# Patient Record
Sex: FEMALE | Race: WHITE | NOT HISPANIC OR LATINO
[De-identification: names, ages, dates, MRNs, and addresses within clinical notes are randomized per-mention and may not be internally consistent; named-entity substitution may affect disease eponyms.]

---

## 2017-01-10 ENCOUNTER — APPOINTMENT (OUTPATIENT)
Dept: INTERNAL MEDICINE | Facility: CLINIC | Age: 62
End: 2017-01-10

## 2017-01-10 VITALS
SYSTOLIC BLOOD PRESSURE: 142 MMHG | RESPIRATION RATE: 12 BRPM | BODY MASS INDEX: 50.02 KG/M2 | WEIGHT: 293 LBS | HEIGHT: 64 IN | DIASTOLIC BLOOD PRESSURE: 85 MMHG | HEART RATE: 89 BPM | TEMPERATURE: 98.3 F | OXYGEN SATURATION: 98 %

## 2017-01-10 DIAGNOSIS — Z72.3 LACK OF PHYSICAL EXERCISE: ICD-10-CM

## 2017-01-11 LAB
25(OH)D3 SERPL-MCNC: 42 NG/ML
ALBUMIN SERPL ELPH-MCNC: 3.9 G/DL
ALP BLD-CCNC: 70 U/L
ALT SERPL-CCNC: 60 U/L
ANION GAP SERPL CALC-SCNC: 18 MMOL/L
AST SERPL-CCNC: 54 U/L
BILIRUB SERPL-MCNC: 0.3 MG/DL
BUN SERPL-MCNC: 16 MG/DL
CALCIUM SERPL-MCNC: 9.9 MG/DL
CHLORIDE SERPL-SCNC: 103 MMOL/L
CHOLEST SERPL-MCNC: 202 MG/DL
CHOLEST/HDLC SERPL: 3.1 RATIO
CO2 SERPL-SCNC: 22 MMOL/L
CREAT SERPL-MCNC: 0.82 MG/DL
GLUCOSE SERPL-MCNC: 153 MG/DL
HDLC SERPL-MCNC: 66 MG/DL
LDLC SERPL CALC-MCNC: 110 MG/DL
POTASSIUM SERPL-SCNC: 4.7 MMOL/L
PROT SERPL-MCNC: 7.2 G/DL
SODIUM SERPL-SCNC: 143 MMOL/L
TRIGL SERPL-MCNC: 131 MG/DL

## 2017-01-12 ENCOUNTER — APPOINTMENT (OUTPATIENT)
Dept: ENDOCRINOLOGY | Facility: CLINIC | Age: 62
End: 2017-01-12

## 2017-01-18 ENCOUNTER — MEDICATION RENEWAL (OUTPATIENT)
Age: 62
End: 2017-01-18

## 2017-01-18 LAB — HBA1C MFR BLD HPLC: 7.8 %

## 2017-03-10 ENCOUNTER — APPOINTMENT (OUTPATIENT)
Dept: ENDOCRINOLOGY | Facility: CLINIC | Age: 62
End: 2017-03-10

## 2017-03-10 VITALS
DIASTOLIC BLOOD PRESSURE: 78 MMHG | HEART RATE: 86 BPM | OXYGEN SATURATION: 98 % | BODY MASS INDEX: 49.85 KG/M2 | HEIGHT: 64 IN | SYSTOLIC BLOOD PRESSURE: 122 MMHG | WEIGHT: 292 LBS

## 2017-04-11 ENCOUNTER — NON-APPOINTMENT (OUTPATIENT)
Age: 62
End: 2017-04-11

## 2017-04-11 ENCOUNTER — APPOINTMENT (OUTPATIENT)
Dept: INTERNAL MEDICINE | Facility: CLINIC | Age: 62
End: 2017-04-11

## 2017-04-11 VITALS
WEIGHT: 293 LBS | HEIGHT: 64 IN | RESPIRATION RATE: 12 BRPM | HEART RATE: 86 BPM | TEMPERATURE: 98.4 F | OXYGEN SATURATION: 98 % | SYSTOLIC BLOOD PRESSURE: 145 MMHG | BODY MASS INDEX: 50.02 KG/M2 | DIASTOLIC BLOOD PRESSURE: 83 MMHG

## 2017-04-11 VITALS — SYSTOLIC BLOOD PRESSURE: 125 MMHG | DIASTOLIC BLOOD PRESSURE: 70 MMHG

## 2017-04-11 DIAGNOSIS — Z00.00 ENCOUNTER FOR GENERAL ADULT MEDICAL EXAMINATION W/OUT ABNORMAL FINDINGS: ICD-10-CM

## 2017-04-12 LAB
25(OH)D3 SERPL-MCNC: 47.6 NG/ML
ALBUMIN SERPL ELPH-MCNC: 3.8 G/DL
ALP BLD-CCNC: 58 U/L
ALT SERPL-CCNC: 44 U/L
ANION GAP SERPL CALC-SCNC: 14 MMOL/L
APPEARANCE: CLEAR
AST SERPL-CCNC: 34 U/L
BASOPHILS # BLD AUTO: 0.04 K/UL
BASOPHILS NFR BLD AUTO: 0.4 %
BILIRUB SERPL-MCNC: 0.2 MG/DL
BILIRUBIN URINE: NEGATIVE
BLOOD URINE: NEGATIVE
BUN SERPL-MCNC: 16 MG/DL
CALCIUM SERPL-MCNC: 9.3 MG/DL
CHLORIDE SERPL-SCNC: 104 MMOL/L
CHOLEST SERPL-MCNC: 173 MG/DL
CHOLEST/HDLC SERPL: 3.1 RATIO
CO2 SERPL-SCNC: 20 MMOL/L
COLOR: YELLOW
CREAT SERPL-MCNC: 0.72 MG/DL
EOSINOPHIL # BLD AUTO: 0.24 K/UL
EOSINOPHIL NFR BLD AUTO: 2.7 %
GLUCOSE QUALITATIVE U: NORMAL MG/DL
GLUCOSE SERPL-MCNC: 167 MG/DL
HBA1C MFR BLD HPLC: 8.1 %
HCT VFR BLD CALC: 40 %
HDLC SERPL-MCNC: 56 MG/DL
HGB BLD-MCNC: 12.7 G/DL
IMM GRANULOCYTES NFR BLD AUTO: 0.2 %
KETONES URINE: NEGATIVE
LDLC SERPL CALC-MCNC: 93 MG/DL
LEUKOCYTE ESTERASE URINE: NEGATIVE
LYMPHOCYTES # BLD AUTO: 2.38 K/UL
LYMPHOCYTES NFR BLD AUTO: 26.4 %
MAN DIFF?: NORMAL
MCHC RBC-ENTMCNC: 27.4 PG
MCHC RBC-ENTMCNC: 31.8 GM/DL
MCV RBC AUTO: 86.2 FL
MONOCYTES # BLD AUTO: 0.78 K/UL
MONOCYTES NFR BLD AUTO: 8.6 %
NEUTROPHILS # BLD AUTO: 5.57 K/UL
NEUTROPHILS NFR BLD AUTO: 61.7 %
NITRITE URINE: NEGATIVE
PH URINE: 5.5
PLATELET # BLD AUTO: 292 K/UL
POTASSIUM SERPL-SCNC: 4.5 MMOL/L
PROT SERPL-MCNC: 6.7 G/DL
PROTEIN URINE: NEGATIVE MG/DL
RBC # BLD: 4.64 M/UL
RBC # FLD: 14.1 %
SODIUM SERPL-SCNC: 138 MMOL/L
SPECIFIC GRAVITY URINE: 1.02
TRIGL SERPL-MCNC: 120 MG/DL
TSH SERPL-ACNC: 2.64 UIU/ML
URATE SERPL-MCNC: 6.1 MG/DL
UROBILINOGEN URINE: NORMAL MG/DL
WBC # FLD AUTO: 9.03 K/UL

## 2017-06-13 ENCOUNTER — APPOINTMENT (OUTPATIENT)
Dept: ENDOCRINOLOGY | Facility: CLINIC | Age: 62
End: 2017-06-13

## 2017-06-13 VITALS
WEIGHT: 273 LBS | HEART RATE: 87 BPM | BODY MASS INDEX: 46.61 KG/M2 | OXYGEN SATURATION: 98 % | DIASTOLIC BLOOD PRESSURE: 70 MMHG | SYSTOLIC BLOOD PRESSURE: 122 MMHG | HEIGHT: 64 IN

## 2017-06-13 RX ORDER — GLIMEPIRIDE 1 MG/1
1 TABLET ORAL DAILY
Qty: 90 | Refills: 3 | Status: DISCONTINUED | COMMUNITY
Start: 2017-03-10 | End: 2017-06-13

## 2017-07-11 ENCOUNTER — APPOINTMENT (OUTPATIENT)
Dept: INTERNAL MEDICINE | Facility: CLINIC | Age: 62
End: 2017-07-11

## 2017-07-11 VITALS
BODY MASS INDEX: 46.1 KG/M2 | DIASTOLIC BLOOD PRESSURE: 84 MMHG | HEART RATE: 77 BPM | HEIGHT: 64 IN | WEIGHT: 270 LBS | RESPIRATION RATE: 14 BRPM | TEMPERATURE: 99 F | SYSTOLIC BLOOD PRESSURE: 129 MMHG | OXYGEN SATURATION: 98 %

## 2017-07-12 LAB
ALBUMIN SERPL ELPH-MCNC: 3.9 G/DL
ALP BLD-CCNC: 56 U/L
ALT SERPL-CCNC: 77 U/L
ANION GAP SERPL CALC-SCNC: 16 MMOL/L
AST SERPL-CCNC: 48 U/L
BILIRUB SERPL-MCNC: 0.2 MG/DL
BUN SERPL-MCNC: 19 MG/DL
CALCIUM SERPL-MCNC: 9.6 MG/DL
CHLORIDE SERPL-SCNC: 105 MMOL/L
CHOLEST SERPL-MCNC: 167 MG/DL
CHOLEST/HDLC SERPL: 2.8 RATIO
CO2 SERPL-SCNC: 22 MMOL/L
CREAT SERPL-MCNC: 0.85 MG/DL
GLUCOSE SERPL-MCNC: 113 MG/DL
HDLC SERPL-MCNC: 60 MG/DL
LDLC SERPL CALC-MCNC: 86 MG/DL
POTASSIUM SERPL-SCNC: 4.7 MMOL/L
PROT SERPL-MCNC: 6.8 G/DL
SODIUM SERPL-SCNC: 143 MMOL/L
TRIGL SERPL-MCNC: 104 MG/DL

## 2017-07-13 LAB — HBA1C MFR BLD HPLC: 6.2 %

## 2017-10-25 ENCOUNTER — APPOINTMENT (OUTPATIENT)
Dept: ENDOCRINOLOGY | Facility: CLINIC | Age: 62
End: 2017-10-25
Payer: COMMERCIAL

## 2017-10-25 VITALS
HEART RATE: 88 BPM | SYSTOLIC BLOOD PRESSURE: 120 MMHG | BODY MASS INDEX: 46.78 KG/M2 | OXYGEN SATURATION: 98 % | HEIGHT: 64 IN | DIASTOLIC BLOOD PRESSURE: 70 MMHG | WEIGHT: 274 LBS

## 2017-10-25 LAB
GLUCOSE BLDC GLUCOMTR-MCNC: 76
HBA1C MFR BLD HPLC: 6.4

## 2017-10-25 PROCEDURE — 82962 GLUCOSE BLOOD TEST: CPT

## 2017-10-25 PROCEDURE — 83036 HEMOGLOBIN GLYCOSYLATED A1C: CPT | Mod: QW

## 2017-10-25 PROCEDURE — G0008: CPT

## 2017-10-25 PROCEDURE — 99214 OFFICE O/P EST MOD 30 MIN: CPT | Mod: 25

## 2017-10-25 PROCEDURE — 90686 IIV4 VACC NO PRSV 0.5 ML IM: CPT

## 2017-10-27 LAB
25(OH)D3 SERPL-MCNC: 54.1 NG/ML
ALBUMIN SERPL ELPH-MCNC: 4.2 G/DL
ALP BLD-CCNC: 51 U/L
ALT SERPL-CCNC: 45 U/L
ANION GAP SERPL CALC-SCNC: 18 MMOL/L
AST SERPL-CCNC: 29 U/L
BASOPHILS # BLD AUTO: 0.04 K/UL
BASOPHILS NFR BLD AUTO: 0.3 %
BILIRUB SERPL-MCNC: <0.2 MG/DL
BUN SERPL-MCNC: 22 MG/DL
CALCIUM SERPL-MCNC: 9.9 MG/DL
CHLORIDE SERPL-SCNC: 105 MMOL/L
CHOLEST SERPL-MCNC: 211 MG/DL
CHOLEST/HDLC SERPL: 3 RATIO
CO2 SERPL-SCNC: 19 MMOL/L
CREAT SERPL-MCNC: 0.79 MG/DL
CREAT SPEC-SCNC: 87 MG/DL
EOSINOPHIL # BLD AUTO: 0.23 K/UL
EOSINOPHIL NFR BLD AUTO: 1.9 %
GLUCOSE SERPL-MCNC: 83 MG/DL
HCT VFR BLD CALC: 40.4 %
HDLC SERPL-MCNC: 70 MG/DL
HGB BLD-MCNC: 13.1 G/DL
IMM GRANULOCYTES NFR BLD AUTO: 0.3 %
LDLC SERPL CALC-MCNC: 115 MG/DL
LYMPHOCYTES # BLD AUTO: 3.89 K/UL
LYMPHOCYTES NFR BLD AUTO: 32.7 %
MAN DIFF?: NORMAL
MCHC RBC-ENTMCNC: 28.2 PG
MCHC RBC-ENTMCNC: 32.4 GM/DL
MCV RBC AUTO: 86.9 FL
MICROALBUMIN 24H UR DL<=1MG/L-MCNC: <0.3 MG/DL
MICROALBUMIN/CREAT 24H UR-RTO: NORMAL
MONOCYTES # BLD AUTO: 0.85 K/UL
MONOCYTES NFR BLD AUTO: 7.1 %
NEUTROPHILS # BLD AUTO: 6.85 K/UL
NEUTROPHILS NFR BLD AUTO: 57.7 %
PLATELET # BLD AUTO: 311 K/UL
POTASSIUM SERPL-SCNC: 4.8 MMOL/L
PROT SERPL-MCNC: 7.4 G/DL
RBC # BLD: 4.65 M/UL
RBC # FLD: 13.7 %
SODIUM SERPL-SCNC: 142 MMOL/L
T4 FREE SERPL-MCNC: 1.1 NG/DL
TRIGL SERPL-MCNC: 128 MG/DL
TSH SERPL-ACNC: 2.22 UIU/ML
WBC # FLD AUTO: 11.9 K/UL

## 2017-10-31 ENCOUNTER — APPOINTMENT (OUTPATIENT)
Dept: INTERNAL MEDICINE | Facility: CLINIC | Age: 62
End: 2017-10-31

## 2018-04-09 ENCOUNTER — APPOINTMENT (OUTPATIENT)
Dept: ENDOCRINOLOGY | Facility: CLINIC | Age: 63
End: 2018-04-09
Payer: COMMERCIAL

## 2018-04-09 VITALS — HEART RATE: 83 BPM | OXYGEN SATURATION: 98 % | DIASTOLIC BLOOD PRESSURE: 75 MMHG | SYSTOLIC BLOOD PRESSURE: 115 MMHG

## 2018-04-09 VITALS — WEIGHT: 284 LBS | BODY MASS INDEX: 48.49 KG/M2 | HEIGHT: 64 IN

## 2018-04-09 LAB
GLUCOSE BLDC GLUCOMTR-MCNC: 142
HBA1C MFR BLD HPLC: 6.8

## 2018-04-09 PROCEDURE — 99214 OFFICE O/P EST MOD 30 MIN: CPT | Mod: 25

## 2018-04-09 PROCEDURE — 82962 GLUCOSE BLOOD TEST: CPT

## 2018-04-09 PROCEDURE — 77080 DXA BONE DENSITY AXIAL: CPT

## 2018-04-09 PROCEDURE — 83036 HEMOGLOBIN GLYCOSYLATED A1C: CPT | Mod: QW

## 2018-04-10 LAB
25(OH)D3 SERPL-MCNC: 44.7 NG/ML
ALBUMIN SERPL ELPH-MCNC: 4 G/DL
ALP BLD-CCNC: 52 U/L
ALT SERPL-CCNC: 31 U/L
ANION GAP SERPL CALC-SCNC: 14 MMOL/L
AST SERPL-CCNC: 23 U/L
BASOPHILS # BLD AUTO: 0.03 K/UL
BASOPHILS NFR BLD AUTO: 0.3 %
BILIRUB SERPL-MCNC: <0.2 MG/DL
BUN SERPL-MCNC: 25 MG/DL
CALCIUM SERPL-MCNC: 10 MG/DL
CHLORIDE SERPL-SCNC: 107 MMOL/L
CHOLEST SERPL-MCNC: 202 MG/DL
CHOLEST/HDLC SERPL: 3.3 RATIO
CO2 SERPL-SCNC: 21 MMOL/L
CREAT SERPL-MCNC: 0.92 MG/DL
CREAT SPEC-SCNC: 120 MG/DL
EOSINOPHIL # BLD AUTO: 0.21 K/UL
EOSINOPHIL NFR BLD AUTO: 2 %
GLUCOSE SERPL-MCNC: 145 MG/DL
HCT VFR BLD CALC: 39.6 %
HDLC SERPL-MCNC: 62 MG/DL
HGB BLD-MCNC: 12.6 G/DL
IMM GRANULOCYTES NFR BLD AUTO: 0.1 %
LDLC SERPL CALC-MCNC: 107 MG/DL
LYMPHOCYTES # BLD AUTO: 3.2 K/UL
LYMPHOCYTES NFR BLD AUTO: 31 %
MAN DIFF?: NORMAL
MCHC RBC-ENTMCNC: 27.6 PG
MCHC RBC-ENTMCNC: 31.8 GM/DL
MCV RBC AUTO: 86.7 FL
MICROALBUMIN 24H UR DL<=1MG/L-MCNC: 0.6 MG/DL
MICROALBUMIN/CREAT 24H UR-RTO: 5 MG/G
MONOCYTES # BLD AUTO: 0.77 K/UL
MONOCYTES NFR BLD AUTO: 7.5 %
NEUTROPHILS # BLD AUTO: 6.09 K/UL
NEUTROPHILS NFR BLD AUTO: 59.1 %
PLATELET # BLD AUTO: 267 K/UL
POTASSIUM SERPL-SCNC: 4.7 MMOL/L
PROT SERPL-MCNC: 7.1 G/DL
RBC # BLD: 4.57 M/UL
RBC # FLD: 14.6 %
SODIUM SERPL-SCNC: 142 MMOL/L
T4 FREE SERPL-MCNC: 1.2 NG/DL
TRIGL SERPL-MCNC: 164 MG/DL
TSH SERPL-ACNC: 1.89 UIU/ML
VIT B12 SERPL-MCNC: 465 PG/ML
WBC # FLD AUTO: 10.31 K/UL

## 2018-06-27 ENCOUNTER — HOSPITAL ENCOUNTER (OUTPATIENT)
Dept: HOSPITAL 74 - FMAMMOTONE | Age: 63
Discharge: HOME | End: 2018-06-27
Attending: SURGERY
Payer: COMMERCIAL

## 2018-06-27 DIAGNOSIS — N60.32: Primary | ICD-10-CM

## 2018-06-27 DIAGNOSIS — R92.1: ICD-10-CM

## 2018-06-27 DIAGNOSIS — N64.1: ICD-10-CM

## 2018-06-27 DIAGNOSIS — N64.89: ICD-10-CM

## 2018-06-27 PROCEDURE — 0HBU3ZX EXCISION OF LEFT BREAST, PERCUTANEOUS APPROACH, DIAGNOSTIC: ICD-10-PCS

## 2018-06-27 PROCEDURE — A4648 IMPLANTABLE TISSUE MARKER: HCPCS

## 2018-06-28 NOTE — PATH
Surgical Pathology Report



Patient Name:  ZULLY RO

Accession #:  [id number]

[facility]. Rec. #:  [id number]                                                        

   /Age/Gender:  1955 (Age: 63) / F

Account:  [id number]                                                          

             Location: [facility]

Taken:  2018

Received:  2018

Reported:  2018

Physicians:  POOJA Mccarty M.D.

  



Specimen(s) Received

A: LEFT BREAST SPECIMEN WITH CALCIFICATIONS 

B: LEFT BREAST SPECIMEN WITHOUT CALCIFICATIONS 





Clinical History

Nonpalpable lesion

Mammographic findings: Microcalcification, suspicious







Final Diagnosis

A. BREAST, LEFT, WITH CALCIFICATIONS, STEREOTACTIC BIOPSY:

BENIGN BREAST TISSUE SHOWING FAT NECROSIS AND STROMAL FIBROSIS WITH ASSOCIATED

CALCIFICATIONS.



B. BREAST, LEFT, WITHOUT CALCIFICATIONS, STEREOTACTIC BIOPSY:

BENIGN BREAST TISSUE SHOWING FAT NECROSIS AND STROMAL FIBROSIS.







***Electronically Signed***

Sharita Saucedo M.D.





Gross Description

A. Received in formalin labeled "left breast with calcifications," is a 0.7 x

0.5 x 0.2 cm aggregate of multiple tan-yellow, irregular to cylindrical portions

of fibroadipose tissue. The formalin is filtered and the specimen is entirely

submitted in one cassette.



B. Received in formalin labeled "left breast without calcifications," is a 2.4 x

1.7 x 0.3 cm aggregate of multiple tan-yellow, irregular to cylindrical portions

of fibroadipose tissue. The formalin is filtered and the specimen is entirely

submitted in one cassette.



Time to formalin fixation: 6 minutes

Total formalin fixation time: Approximately 7 hours.





saudi

## 2018-07-30 ENCOUNTER — LABORATORY RESULT (OUTPATIENT)
Age: 63
End: 2018-07-30

## 2018-07-30 ENCOUNTER — APPOINTMENT (OUTPATIENT)
Dept: ENDOCRINOLOGY | Facility: CLINIC | Age: 63
End: 2018-07-30
Payer: COMMERCIAL

## 2018-07-30 VITALS
SYSTOLIC BLOOD PRESSURE: 120 MMHG | HEART RATE: 64 BPM | OXYGEN SATURATION: 98 % | WEIGHT: 288 LBS | HEIGHT: 64 IN | DIASTOLIC BLOOD PRESSURE: 50 MMHG | BODY MASS INDEX: 49.17 KG/M2

## 2018-07-30 VITALS — SYSTOLIC BLOOD PRESSURE: 128 MMHG | DIASTOLIC BLOOD PRESSURE: 60 MMHG

## 2018-07-30 LAB — HBA1C MFR BLD HPLC: 6.6

## 2018-07-30 PROCEDURE — 99214 OFFICE O/P EST MOD 30 MIN: CPT | Mod: 25

## 2018-07-30 PROCEDURE — 83036 HEMOGLOBIN GLYCOSYLATED A1C: CPT | Mod: QW

## 2018-07-31 LAB
25(OH)D3 SERPL-MCNC: 59.5 NG/ML
ALBUMIN SERPL ELPH-MCNC: 4.3 G/DL
ALP BLD-CCNC: 53 U/L
ALT SERPL-CCNC: 43 U/L
ANION GAP SERPL CALC-SCNC: 15 MMOL/L
AST SERPL-CCNC: 41 U/L
BASOPHILS # BLD AUTO: 0.04 K/UL
BASOPHILS NFR BLD AUTO: 0.4 %
BILIRUB SERPL-MCNC: <0.2 MG/DL
BUN SERPL-MCNC: 18 MG/DL
CALCIUM SERPL-MCNC: 10 MG/DL
CHLORIDE SERPL-SCNC: 103 MMOL/L
CHOLEST SERPL-MCNC: 200 MG/DL
CHOLEST/HDLC SERPL: 3.1 RATIO
CO2 SERPL-SCNC: 24 MMOL/L
CREAT SERPL-MCNC: 0.77 MG/DL
CREAT SPEC-SCNC: 111 MG/DL
EOSINOPHIL # BLD AUTO: 0.19 K/UL
EOSINOPHIL NFR BLD AUTO: 1.8 %
GLUCOSE SERPL-MCNC: 93 MG/DL
HCT VFR BLD CALC: 41.1 %
HDLC SERPL-MCNC: 64 MG/DL
HGB BLD-MCNC: 13.6 G/DL
IMM GRANULOCYTES NFR BLD AUTO: 0.4 %
LDLC SERPL CALC-MCNC: 105 MG/DL
LYMPHOCYTES # BLD AUTO: 3.26 K/UL
LYMPHOCYTES NFR BLD AUTO: 31.4 %
MAN DIFF?: NORMAL
MCHC RBC-ENTMCNC: 28.6 PG
MCHC RBC-ENTMCNC: 33.1 GM/DL
MCV RBC AUTO: 86.5 FL
MICROALBUMIN 24H UR DL<=1MG/L-MCNC: <1.2 MG/DL
MICROALBUMIN/CREAT 24H UR-RTO: NORMAL
MONOCYTES # BLD AUTO: 0.75 K/UL
MONOCYTES NFR BLD AUTO: 7.2 %
NEUTROPHILS # BLD AUTO: 6.11 K/UL
NEUTROPHILS NFR BLD AUTO: 58.8 %
PLATELET # BLD AUTO: 259 K/UL
POTASSIUM SERPL-SCNC: 4.5 MMOL/L
PROT SERPL-MCNC: 7.2 G/DL
RBC # BLD: 4.75 M/UL
RBC # FLD: 14.3 %
SODIUM SERPL-SCNC: 142 MMOL/L
T4 FREE SERPL-MCNC: 1.3 NG/DL
TRIGL SERPL-MCNC: 154 MG/DL
TSH SERPL-ACNC: 2.75 UIU/ML
WBC # FLD AUTO: 10.39 K/UL

## 2018-12-05 ENCOUNTER — APPOINTMENT (OUTPATIENT)
Dept: ENDOCRINOLOGY | Facility: CLINIC | Age: 63
End: 2018-12-05
Payer: COMMERCIAL

## 2018-12-05 VITALS
HEART RATE: 86 BPM | WEIGHT: 288 LBS | HEIGHT: 64 IN | BODY MASS INDEX: 49.17 KG/M2 | DIASTOLIC BLOOD PRESSURE: 70 MMHG | SYSTOLIC BLOOD PRESSURE: 140 MMHG | OXYGEN SATURATION: 98 %

## 2018-12-05 VITALS — DIASTOLIC BLOOD PRESSURE: 68 MMHG | SYSTOLIC BLOOD PRESSURE: 128 MMHG

## 2018-12-05 LAB
GLUCOSE BLDC GLUCOMTR-MCNC: 90
HBA1C MFR BLD HPLC: 7

## 2018-12-05 PROCEDURE — 82962 GLUCOSE BLOOD TEST: CPT

## 2018-12-05 PROCEDURE — 99214 OFFICE O/P EST MOD 30 MIN: CPT | Mod: 25

## 2018-12-05 PROCEDURE — 83036 HEMOGLOBIN GLYCOSYLATED A1C: CPT | Mod: QW

## 2018-12-05 PROCEDURE — G0008: CPT

## 2018-12-05 PROCEDURE — 90686 IIV4 VACC NO PRSV 0.5 ML IM: CPT

## 2018-12-06 LAB
25(OH)D3 SERPL-MCNC: 52 NG/ML
ALBUMIN SERPL ELPH-MCNC: 3.8 G/DL
ALP BLD-CCNC: 58 U/L
ALT SERPL-CCNC: 31 U/L
ANION GAP SERPL CALC-SCNC: 15 MMOL/L
AST SERPL-CCNC: 30 U/L
BASOPHILS # BLD AUTO: 0.02 K/UL
BASOPHILS NFR BLD AUTO: 0.2 %
BILIRUB SERPL-MCNC: <0.2 MG/DL
BUN SERPL-MCNC: 20 MG/DL
CALCIUM SERPL-MCNC: 9.6 MG/DL
CHLORIDE SERPL-SCNC: 105 MMOL/L
CHOLEST SERPL-MCNC: 194 MG/DL
CHOLEST/HDLC SERPL: 3.2 RATIO
CO2 SERPL-SCNC: 20 MMOL/L
CREAT SERPL-MCNC: 0.81 MG/DL
EOSINOPHIL # BLD AUTO: 0.13 K/UL
EOSINOPHIL NFR BLD AUTO: 1.3 %
GLUCOSE SERPL-MCNC: 98 MG/DL
HCT VFR BLD CALC: 39.8 %
HDLC SERPL-MCNC: 60 MG/DL
HGB BLD-MCNC: 12.9 G/DL
IMM GRANULOCYTES NFR BLD AUTO: 0.4 %
LDLC SERPL CALC-MCNC: 104 MG/DL
LYMPHOCYTES # BLD AUTO: 2.84 K/UL
LYMPHOCYTES NFR BLD AUTO: 27.6 %
MAN DIFF?: NORMAL
MCHC RBC-ENTMCNC: 27.7 PG
MCHC RBC-ENTMCNC: 32.4 GM/DL
MCV RBC AUTO: 85.4 FL
MONOCYTES # BLD AUTO: 0.7 K/UL
MONOCYTES NFR BLD AUTO: 6.8 %
NEUTROPHILS # BLD AUTO: 6.57 K/UL
NEUTROPHILS NFR BLD AUTO: 63.7 %
PLATELET # BLD AUTO: 280 K/UL
POTASSIUM SERPL-SCNC: 4.4 MMOL/L
PROT SERPL-MCNC: 7.1 G/DL
RBC # BLD: 4.66 M/UL
RBC # FLD: 14.3 %
SODIUM SERPL-SCNC: 140 MMOL/L
T4 FREE SERPL-MCNC: 1.2 NG/DL
TRIGL SERPL-MCNC: 149 MG/DL
TSH SERPL-ACNC: 2.47 UIU/ML
VIT B12 SERPL-MCNC: 372 PG/ML
WBC # FLD AUTO: 10.3 K/UL

## 2019-04-05 ENCOUNTER — TRANSCRIPTION ENCOUNTER (OUTPATIENT)
Age: 64
End: 2019-04-05

## 2019-04-05 ENCOUNTER — APPOINTMENT (OUTPATIENT)
Dept: ENDOCRINOLOGY | Facility: CLINIC | Age: 64
End: 2019-04-05
Payer: COMMERCIAL

## 2019-04-05 VITALS
SYSTOLIC BLOOD PRESSURE: 138 MMHG | OXYGEN SATURATION: 98 % | BODY MASS INDEX: 47.46 KG/M2 | HEIGHT: 64 IN | HEART RATE: 86 BPM | DIASTOLIC BLOOD PRESSURE: 70 MMHG | WEIGHT: 278 LBS

## 2019-04-05 LAB
GLUCOSE BLDC GLUCOMTR-MCNC: 128
HBA1C MFR BLD HPLC: 7.1

## 2019-04-05 PROCEDURE — 82962 GLUCOSE BLOOD TEST: CPT

## 2019-04-05 PROCEDURE — 99214 OFFICE O/P EST MOD 30 MIN: CPT | Mod: 25

## 2019-04-05 PROCEDURE — 83036 HEMOGLOBIN GLYCOSYLATED A1C: CPT | Mod: QW

## 2019-04-05 NOTE — PHYSICAL EXAM
[Alert] : alert [No Acute Distress] : no acute distress [Normal Sclera/Conjunctiva] : normal sclera/conjunctiva [EOMI] : extra ocular movement intact [Supple] : the neck was supple [No LAD] : no lymphadenopathy [Thyroid Not Enlarged] : the thyroid was not enlarged [No Accessory Muscle Use] : no accessory muscle use [Clear to Auscultation] : lungs were clear to auscultation bilaterally [Normal S1, S2] : normal S1 and S2 [Regular Rhythm] : with a regular rhythm [Pedal Pulses Normal] : the pedal pulses are present [Normal Bowel Sounds] : normal bowel sounds [Not Tender] : non-tender [Soft] : abdomen soft [Not Distended] : not distended [Normal] : normal and non tender [No Clubbing, Cyanosis] : no clubbing  or cyanosis of the fingernails [No Rash] : no rash [Right Foot Was Examined] : right foot ~C was examined [Left Foot Was Examined] : left foot ~C was examined [2+] : 2+ in the dorsalis pedis [Normal Reflexes] : deep tendon reflexes were 2+ and symmetric [Normal Affect] : the affect was normal [Normal Mood] : the mood was normal [Diminished Throughout Both Feet] : normal tactile sensation with monofilament testing throughout both feet [de-identified] : b/l edema with varicose veins [FreeTextEntry1] : callus [FreeTextEntry2] : onychomycosis [FreeTextEntry5] : callus [FreeTextEntry6] : onychomycosis

## 2019-04-05 NOTE — HISTORY OF PRESENT ILLNESS
[FreeTextEntry1] : 64 y.o. female with h/o Type 2 DM, HTN and hyperlipidemia here for follow up visit. Frustrated with weight but stable since last visit. Diet higher in carbs. Reports late night snacking with sweets like cookies.  Had joined weight loss program at [place] and lost 22 pounds last year. Checks FS twice daily. Fasting blood glucose levels are 119 to 176. Before dinner FS are 100s to 110s.  Taking Metformin 500 mg 2 BID and Victoza 1.8 mg SQ daily. Stopped glimepiride since May 8th 2017. Feeling good. No polyuria and no polydipsia. No SOB or CP. UTD with optho and went in June 2017 and no retinopathy. Does get heel cracking of feet. Never started statin. C/o b/l knee pain. No exercise. \par \par Regarding vitamin D def, takes vitamin D3 2,000 IU daily

## 2019-04-05 NOTE — ASSESSMENT
[Carbohydrate Consistent Diet] : carbohydrate consistent diet [Diabetes Foot Care] : diabetes foot care [Long Term Vascular Complications] : long term vascular complications of diabetes [Importance of Diet and Exercise] : importance of diet and exercise to improve glycemic control, achieve weight loss and improve cardiovascular health [Incretin Mimetic Therapy] : Risks and benefits of incretin mimetic therapy were discussed with the patient including nauseau, pancreatitis and potential risk of medullary thyroid cancer [FreeTextEntry1] : 64 y.o. female with h/o Type 2 DM, HTN, hyperlipidemia and obesity.\par 1. Type 2 DM- Good control with Hba1c of 7.1%. Encouraged carbohydrate consistent diet and exercise. Will continue Metformin 1,000 mg BID and Victoza 1.8 mg SQ daily. Encouraged patient to keep in touch with blood glucose log. Will check CMP and urine microalb/cr ratio. \par 2. HTN- BP is at goal and will continue ARB.\par 3. Hyperlipidemia- Recommend starting statin which is also helpful for CV risk reduction. Patient declines statin at this time. Will check lipids\par 4. Obesity- Encouraged diet changes and exercise. Will check TFTs.\par 5. Vitamin D def- Normal 25 vitamin D level and will continue supplement.\par 6. Bone Health- DEXA scan performed in April 2018 is normal with spine 2.1 (arthritic changes), left femoral neck 0.4 and total hip 2.1, and 1.3 radius 0.0. Average risk of fracture. Encouraged weight bearing activity. Will monitor for now and repeat DEXA scan in several years. \par \par Recommend follow up with podiatry and opthalmology\par Recommend baseline cardiology evaluation\par Follow up in 3 to 4 months

## 2019-04-05 NOTE — REVIEW OF SYSTEMS
[Polyuria] : polyuria [Joint Pain] : joint pain [Dry Skin] : dry skin [Negative] : Gastrointestinal [Recent Weight Gain (___ Lbs)] : no recent weight gain [Recent Weight Loss (___ Lbs)] : no recent weight loss [Pain/Numbness of Digits] : no pain/numbness of digits [Polydipsia] : no polydipsia [Swelling] : no swelling

## 2019-04-08 LAB
25(OH)D3 SERPL-MCNC: 57.5 NG/ML
ALBUMIN SERPL ELPH-MCNC: 4.4 G/DL
ALP BLD-CCNC: 56 U/L
ALT SERPL-CCNC: 30 U/L
ANION GAP SERPL CALC-SCNC: 14 MMOL/L
AST SERPL-CCNC: 23 U/L
BASOPHILS # BLD AUTO: 0.05 K/UL
BASOPHILS NFR BLD AUTO: 0.5 %
BILIRUB SERPL-MCNC: <0.2 MG/DL
BUN SERPL-MCNC: 16 MG/DL
CALCIUM SERPL-MCNC: 9.9 MG/DL
CHLORIDE SERPL-SCNC: 105 MMOL/L
CHOLEST SERPL-MCNC: 184 MG/DL
CHOLEST/HDLC SERPL: 3 RATIO
CO2 SERPL-SCNC: 23 MMOL/L
CREAT SERPL-MCNC: 0.72 MG/DL
CREAT SPEC-SCNC: 118 MG/DL
EOSINOPHIL # BLD AUTO: 0.14 K/UL
EOSINOPHIL NFR BLD AUTO: 1.3 %
GLUCOSE SERPL-MCNC: 116 MG/DL
HCT VFR BLD CALC: 42.1 %
HDLC SERPL-MCNC: 61 MG/DL
HGB BLD-MCNC: 13 G/DL
IMM GRANULOCYTES NFR BLD AUTO: 0.2 %
LDLC SERPL CALC-MCNC: 90 MG/DL
LYMPHOCYTES # BLD AUTO: 3.19 K/UL
LYMPHOCYTES NFR BLD AUTO: 30.1 %
MAN DIFF?: NORMAL
MCHC RBC-ENTMCNC: 27.1 PG
MCHC RBC-ENTMCNC: 30.9 GM/DL
MCV RBC AUTO: 87.9 FL
MICROALBUMIN 24H UR DL<=1MG/L-MCNC: <1.2 MG/DL
MICROALBUMIN/CREAT 24H UR-RTO: NORMAL MG/G
MONOCYTES # BLD AUTO: 1 K/UL
MONOCYTES NFR BLD AUTO: 9.4 %
NEUTROPHILS # BLD AUTO: 6.2 K/UL
NEUTROPHILS NFR BLD AUTO: 58.5 %
PLATELET # BLD AUTO: 263 K/UL
POTASSIUM SERPL-SCNC: 4.5 MMOL/L
PROT SERPL-MCNC: 7.2 G/DL
RBC # BLD: 4.79 M/UL
RBC # FLD: 13.4 %
SODIUM SERPL-SCNC: 142 MMOL/L
T4 FREE SERPL-MCNC: 1.2 NG/DL
TRIGL SERPL-MCNC: 164 MG/DL
TSH SERPL-ACNC: 2.88 UIU/ML
VIT B12 SERPL-MCNC: 399 PG/ML
WBC # FLD AUTO: 10.6 K/UL

## 2019-06-11 ENCOUNTER — FORM ENCOUNTER (OUTPATIENT)
Age: 64
End: 2019-06-11

## 2019-06-18 ENCOUNTER — FORM ENCOUNTER (OUTPATIENT)
Age: 64
End: 2019-06-18

## 2019-08-02 ENCOUNTER — APPOINTMENT (OUTPATIENT)
Dept: ENDOCRINOLOGY | Facility: CLINIC | Age: 64
End: 2019-08-02
Payer: COMMERCIAL

## 2019-08-02 VITALS
BODY MASS INDEX: 49.17 KG/M2 | HEART RATE: 83 BPM | HEIGHT: 64 IN | OXYGEN SATURATION: 98 % | WEIGHT: 288 LBS | DIASTOLIC BLOOD PRESSURE: 70 MMHG | SYSTOLIC BLOOD PRESSURE: 140 MMHG

## 2019-08-02 LAB
GLUCOSE BLDC GLUCOMTR-MCNC: 157
HBA1C MFR BLD HPLC: 7.7

## 2019-08-02 PROCEDURE — 82962 GLUCOSE BLOOD TEST: CPT

## 2019-08-02 PROCEDURE — 83036 HEMOGLOBIN GLYCOSYLATED A1C: CPT | Mod: QW

## 2019-08-02 PROCEDURE — 99214 OFFICE O/P EST MOD 30 MIN: CPT | Mod: 25

## 2019-08-02 NOTE — PHYSICAL EXAM
[Alert] : alert [No Acute Distress] : no acute distress [Normal Sclera/Conjunctiva] : normal sclera/conjunctiva [EOMI] : extra ocular movement intact [No LAD] : no lymphadenopathy [Supple] : the neck was supple [No Accessory Muscle Use] : no accessory muscle use [Thyroid Not Enlarged] : the thyroid was not enlarged [Clear to Auscultation] : lungs were clear to auscultation bilaterally [Normal S1, S2] : normal S1 and S2 [Pedal Pulses Normal] : the pedal pulses are present [Regular Rhythm] : with a regular rhythm [Normal Bowel Sounds] : normal bowel sounds [Not Tender] : non-tender [Soft] : abdomen soft [Not Distended] : not distended [Normal] : normal and non tender [No Clubbing, Cyanosis] : no clubbing  or cyanosis of the fingernails [No Rash] : no rash [Right Foot Was Examined] : right foot ~C was examined [Left Foot Was Examined] : left foot ~C was examined [2+] : 2+ in the dorsalis pedis [Diminished Throughout Both Feet] : normal tactile sensation with monofilament testing throughout both feet [Normal Reflexes] : deep tendon reflexes were 2+ and symmetric [Normal Affect] : the affect was normal [Normal Mood] : the mood was normal [de-identified] : b/l edema with varicose veins [FreeTextEntry1] : callus [FreeTextEntry2] : onychomycosis [FreeTextEntry5] : callus [FreeTextEntry6] : onychomycosis

## 2019-08-02 NOTE — REVIEW OF SYSTEMS
[Recent Weight Gain (___ Lbs)] : no recent weight gain [Recent Weight Loss (___ Lbs)] : no recent weight loss [Polyuria] : polyuria [Joint Pain] : joint pain [Dry Skin] : dry skin [Polydipsia] : no polydipsia [Pain/Numbness of Digits] : no pain/numbness of digits [Swelling] : no swelling [Negative] : Respiratory

## 2019-08-02 NOTE — ASSESSMENT
[FreeTextEntry1] : 64 y.o. female with h/o Type 2 DM, HTN, hyperlipidemia and obesity.\par 1. Type 2 DM- Suboptimal control with Hba1c of 7.7%. Encouraged carbohydrate consistent diet and exercise. Will continue Metformin 1,000 mg BID and Victoza 1.8 mg SQ daily. Encouraged patient to keep in touch with blood glucose log. Will check CMP and urine microalb/cr ratio. \par 2. HTN- BP is borderline and will continue ARB. Recommend low sodium diet. \par 3. Hyperlipidemia- Recommend starting statin which is also helpful for CV risk reduction. Patient declines statin at this time. Will check lipids\par 4. Obesity- Encouraged diet changes and exercise. Will check TFTs.\par 5. Vitamin D def- Normal 25 vitamin D level and will continue supplement.\par 6. Bone Health- DEXA scan performed in April 2018 is normal with spine 2.1 (arthritic changes), left femoral neck 0.4 and total hip 2.1, and 1.3 radius 0.0. Average risk of fracture. Encouraged weight bearing activity. Will monitor for now and repeat DEXA scan in several years. \par \par Recommend follow up with podiatry \par Recommend baseline cardiology evaluation\par Follow up in 3 to 4 months [Carbohydrate Consistent Diet] : carbohydrate consistent diet [Diabetes Foot Care] : diabetes foot care [Long Term Vascular Complications] : long term vascular complications of diabetes [Importance of Diet and Exercise] : importance of diet and exercise to improve glycemic control, achieve weight loss and improve cardiovascular health [Incretin Mimetic Therapy] : Risks and benefits of incretin mimetic therapy were discussed with the patient including nauseau, pancreatitis and potential risk of medullary thyroid cancer

## 2019-08-02 NOTE — HISTORY OF PRESENT ILLNESS
[FreeTextEntry1] : 64 y.o. female with h/o Type 2 DM, HTN and hyperlipidemia here for follow up visit. Frustrated with weight but stable since last visit. Diet higher in carbs.  Had joined weight loss program at [place] and lost 22 pounds in 2018. Checks FS twice daily. Fasting blood glucose levels are 130s to 140. Before dinner FS are 110s.  Taking Metformin 500 mg 2 BID and Victoza 1.8 mg SQ daily. Stopped glimepiride since May 8th 2017. Feeling good. No polyuria and no polydipsia. No SOB or CP. UTD with optho (7/17/19) and no retinopathy. Does get heel cracking of feet. Made appointment with podiatry. Never started statin. C/o b/l knee pain so no walking. received steroid injection recently. No exercise. \par \par For breakfast: eggs with English muffin\par For lunch: boiled egg with cheese\par For dinner: fish, steak, strings beans or salad\par Snacks: fruits with cherries or peaches\par \par Regarding vitamin D def, takes vitamin D3 2,000 IU daily

## 2019-08-05 LAB
25(OH)D3 SERPL-MCNC: 55.2 NG/ML
ALBUMIN SERPL ELPH-MCNC: 4.3 G/DL
ALP BLD-CCNC: 58 U/L
ALT SERPL-CCNC: 26 U/L
ANION GAP SERPL CALC-SCNC: 13 MMOL/L
AST SERPL-CCNC: 25 U/L
BASOPHILS # BLD AUTO: 0.06 K/UL
BASOPHILS NFR BLD AUTO: 0.5 %
BILIRUB SERPL-MCNC: <0.2 MG/DL
BUN SERPL-MCNC: 22 MG/DL
CALCIUM SERPL-MCNC: 9.9 MG/DL
CHLORIDE SERPL-SCNC: 107 MMOL/L
CHOLEST SERPL-MCNC: 192 MG/DL
CHOLEST/HDLC SERPL: 2.9 RATIO
CO2 SERPL-SCNC: 22 MMOL/L
CREAT SERPL-MCNC: 0.84 MG/DL
CREAT SPEC-SCNC: 75 MG/DL
EOSINOPHIL # BLD AUTO: 0.14 K/UL
EOSINOPHIL NFR BLD AUTO: 1.1 %
GLUCOSE SERPL-MCNC: 152 MG/DL
HCT VFR BLD CALC: 41.6 %
HDLC SERPL-MCNC: 67 MG/DL
HGB BLD-MCNC: 13.2 G/DL
IMM GRANULOCYTES NFR BLD AUTO: 0.5 %
LDLC SERPL CALC-MCNC: 101 MG/DL
LYMPHOCYTES # BLD AUTO: 2.87 K/UL
LYMPHOCYTES NFR BLD AUTO: 23.3 %
MAN DIFF?: NORMAL
MCHC RBC-ENTMCNC: 27.3 PG
MCHC RBC-ENTMCNC: 31.7 GM/DL
MCV RBC AUTO: 86.1 FL
MICROALBUMIN 24H UR DL<=1MG/L-MCNC: <1.2 MG/DL
MICROALBUMIN/CREAT 24H UR-RTO: NORMAL MG/G
MONOCYTES # BLD AUTO: 0.99 K/UL
MONOCYTES NFR BLD AUTO: 8 %
NEUTROPHILS # BLD AUTO: 8.22 K/UL
NEUTROPHILS NFR BLD AUTO: 66.6 %
PLATELET # BLD AUTO: 298 K/UL
POTASSIUM SERPL-SCNC: 4.8 MMOL/L
PROT SERPL-MCNC: 7.3 G/DL
RBC # BLD: 4.83 M/UL
RBC # FLD: 13.7 %
SODIUM SERPL-SCNC: 142 MMOL/L
TRIGL SERPL-MCNC: 121 MG/DL
TSH SERPL-ACNC: 2.98 UIU/ML
VIT B12 SERPL-MCNC: 359 PG/ML
WBC # FLD AUTO: 12.34 K/UL

## 2019-10-16 ENCOUNTER — APPOINTMENT (OUTPATIENT)
Dept: ENDOCRINOLOGY | Facility: CLINIC | Age: 64
End: 2019-10-16
Payer: COMMERCIAL

## 2019-10-16 VITALS
HEART RATE: 94 BPM | WEIGHT: 286.2 LBS | HEIGHT: 64 IN | SYSTOLIC BLOOD PRESSURE: 134 MMHG | BODY MASS INDEX: 48.86 KG/M2 | OXYGEN SATURATION: 98 % | DIASTOLIC BLOOD PRESSURE: 80 MMHG

## 2019-10-16 VITALS — DIASTOLIC BLOOD PRESSURE: 72 MMHG | SYSTOLIC BLOOD PRESSURE: 120 MMHG

## 2019-10-16 PROCEDURE — 90674 CCIIV4 VAC NO PRSV 0.5 ML IM: CPT

## 2019-10-16 PROCEDURE — 99214 OFFICE O/P EST MOD 30 MIN: CPT | Mod: 25

## 2019-10-16 PROCEDURE — G0008: CPT

## 2019-10-16 NOTE — REVIEW OF SYSTEMS
[Recent Weight Gain (___ Lbs)] : no recent weight gain [Recent Weight Loss (___ Lbs)] : no recent weight loss [Polyuria] : polyuria [Joint Pain] : joint pain [Dry Skin] : dry skin [Pain/Numbness of Digits] : no pain/numbness of digits [Polydipsia] : no polydipsia [Swelling] : no swelling [Negative] : Gastrointestinal

## 2019-10-16 NOTE — HISTORY OF PRESENT ILLNESS
[FreeTextEntry1] : 64 y.o. female with h/o Type 2 DM, HTN and hyperlipidemia here for follow up visit. Frustrated with weight but stable since last visit. Diet is better.  Had joined weight loss program at [place] and lost 22 pounds in 2018. Checks FS twice daily. Fasting blood glucose levels are 126 to 140. Before dinner FS are 110s to 146.  Taking Metformin 500 mg 2 BID and Victoza 1.8 mg SQ daily. Stopped glimepiride since May 8th 2017. Feeling good. No polyuria and no polydipsia. No SOB or CP. UTD with optho (7/17/19) and no retinopathy. Does get heel cracking of feet. Saw podiatry and being treated for tinea pedis. No proteinuria. Never started statin. C/o b/l knee pain so no walking. Received steroid injection in August. Planning for right knee replacement. No exercise. \par \par For breakfast: eggs with English muffin\par For lunch: boiled egg with cheese\par For dinner: fish, steak, strings beans or salad\par Snacks: fruits with cherries or peaches\par \par Regarding vitamin D def, takes vitamin D3 2,000 IU daily

## 2019-10-16 NOTE — PHYSICAL EXAM
[Alert] : alert [No Acute Distress] : no acute distress [Normal Sclera/Conjunctiva] : normal sclera/conjunctiva [EOMI] : extra ocular movement intact [Supple] : the neck was supple [No LAD] : no lymphadenopathy [Thyroid Not Enlarged] : the thyroid was not enlarged [No Accessory Muscle Use] : no accessory muscle use [Clear to Auscultation] : lungs were clear to auscultation bilaterally [Normal S1, S2] : normal S1 and S2 [Regular Rhythm] : with a regular rhythm [Pedal Pulses Normal] : the pedal pulses are present [Normal Bowel Sounds] : normal bowel sounds [Not Tender] : non-tender [Soft] : abdomen soft [Not Distended] : not distended [Normal] : normal and non tender [No Clubbing, Cyanosis] : no clubbing  or cyanosis of the fingernails [No Rash] : no rash [Right Foot Was Examined] : right foot ~C was examined [Left Foot Was Examined] : left foot ~C was examined [2+] : 2+ in the dorsalis pedis [Diminished Throughout Both Feet] : diminished tactile sensation with monofilament testing throughout both feet [Normal Reflexes] : deep tendon reflexes were 2+ and symmetric [Normal Affect] : the affect was normal [Normal Mood] : the mood was normal [de-identified] : b/l edema with varicose veins [FreeTextEntry1] : callus [FreeTextEntry2] : onychomycosis [FreeTextEntry5] : callus [FreeTextEntry6] : onychomycosis

## 2019-10-16 NOTE — ASSESSMENT
[FreeTextEntry1] : 64 y.o. female with h/o Type 2 DM, HTN, hyperlipidemia and obesity.\par 1. Type 2 DM- Suboptimal control with Hba1c of 7.7% in August 2019. Encouraged carbohydrate consistent diet and exercise. Will continue Metformin 1,000 mg BID and Victoza 1.8 mg SQ daily. Encouraged patient to keep in touch with blood glucose log. Stable CMP and urine microalb/cr ratio. \par 2. HTN- BP is borderline and will continue ARB. Recommend low sodium diet. \par 3. Hyperlipidemia- Recommend starting statin which is also helpful for CV risk reduction. Patient declines statin at this time. \par 4. Obesity- Encouraged diet changes and exercise. Patient is euthyroid.\par 5. Vitamin D def- Normal 25 vitamin D level and will continue supplement.\par 6. Bone Health- DEXA scan performed in April 2018 is normal with spine 2.1 (arthritic changes), left femoral neck 0.4 and total hip 2.1, and 1.3 radius 0.0. Average risk of fracture. Encouraged weight bearing activity. Will monitor for now and repeat DEXA scan in several years. \par \par Recommend baseline cardiology evaluation\par Follow up in 3 to 4 months\par Flu vaccine given today [Carbohydrate Consistent Diet] : carbohydrate consistent diet [Diabetes Foot Care] : diabetes foot care [Long Term Vascular Complications] : long term vascular complications of diabetes [Importance of Diet and Exercise] : importance of diet and exercise to improve glycemic control, achieve weight loss and improve cardiovascular health [Incretin Mimetic Therapy] : Risks and benefits of incretin mimetic therapy were discussed with the patient including nauseau, pancreatitis and potential risk of medullary thyroid cancer

## 2019-11-08 LAB
25(OH)D3 SERPL-MCNC: 81 NG/ML
ALBUMIN SERPL ELPH-MCNC: 4.1 G/DL
ALP BLD-CCNC: 52 U/L
ALT SERPL-CCNC: 27 U/L
ANION GAP SERPL CALC-SCNC: 16 MMOL/L
AST SERPL-CCNC: 20 U/L
BASOPHILS # BLD AUTO: 0.05 K/UL
BASOPHILS NFR BLD AUTO: 0.5 %
BILIRUB SERPL-MCNC: 0.2 MG/DL
BUN SERPL-MCNC: 20 MG/DL
CALCIUM SERPL-MCNC: 9.4 MG/DL
CHLORIDE SERPL-SCNC: 106 MMOL/L
CHOLEST SERPL-MCNC: 187 MG/DL
CHOLEST/HDLC SERPL: 3.1 RATIO
CO2 SERPL-SCNC: 19 MMOL/L
CREAT SERPL-MCNC: 0.88 MG/DL
CREAT SPEC-SCNC: 133 MG/DL
EOSINOPHIL # BLD AUTO: 0.21 K/UL
EOSINOPHIL NFR BLD AUTO: 2.2 %
ESTIMATED AVERAGE GLUCOSE: 163 MG/DL
GLUCOSE SERPL-MCNC: 140 MG/DL
HBA1C MFR BLD HPLC: 7.3 %
HCT VFR BLD CALC: 41 %
HDLC SERPL-MCNC: 61 MG/DL
HGB BLD-MCNC: 12.8 G/DL
IMM GRANULOCYTES NFR BLD AUTO: 0.2 %
LDLC SERPL CALC-MCNC: 101 MG/DL
LYMPHOCYTES # BLD AUTO: 2.58 K/UL
LYMPHOCYTES NFR BLD AUTO: 26.8 %
MAN DIFF?: NORMAL
MCHC RBC-ENTMCNC: 27.6 PG
MCHC RBC-ENTMCNC: 31.2 GM/DL
MCV RBC AUTO: 88.4 FL
MICROALBUMIN 24H UR DL<=1MG/L-MCNC: 1.4 MG/DL
MICROALBUMIN/CREAT 24H UR-RTO: 11 MG/G
MONOCYTES # BLD AUTO: 0.81 K/UL
MONOCYTES NFR BLD AUTO: 8.4 %
NEUTROPHILS # BLD AUTO: 5.94 K/UL
NEUTROPHILS NFR BLD AUTO: 61.9 %
PLATELET # BLD AUTO: 266 K/UL
POTASSIUM SERPL-SCNC: 4.6 MMOL/L
PROT SERPL-MCNC: 6.6 G/DL
RBC # BLD: 4.64 M/UL
RBC # FLD: 13.4 %
SODIUM SERPL-SCNC: 141 MMOL/L
TRIGL SERPL-MCNC: 126 MG/DL
TSH SERPL-ACNC: 2.47 UIU/ML
VIT B12 SERPL-MCNC: 331 PG/ML
WBC # FLD AUTO: 9.61 K/UL

## 2019-12-11 ENCOUNTER — FORM ENCOUNTER (OUTPATIENT)
Age: 64
End: 2019-12-11

## 2019-12-12 ENCOUNTER — FORM ENCOUNTER (OUTPATIENT)
Age: 64
End: 2019-12-12

## 2019-12-16 ENCOUNTER — FORM ENCOUNTER (OUTPATIENT)
Age: 64
End: 2019-12-16

## 2019-12-26 ENCOUNTER — FORM ENCOUNTER (OUTPATIENT)
Age: 64
End: 2019-12-26

## 2020-02-25 ENCOUNTER — RX RENEWAL (OUTPATIENT)
Age: 65
End: 2020-02-25

## 2020-02-26 ENCOUNTER — APPOINTMENT (OUTPATIENT)
Dept: ENDOCRINOLOGY | Facility: CLINIC | Age: 65
End: 2020-02-26
Payer: COMMERCIAL

## 2020-02-26 ENCOUNTER — RX RENEWAL (OUTPATIENT)
Age: 65
End: 2020-02-26

## 2020-02-26 VITALS
DIASTOLIC BLOOD PRESSURE: 82 MMHG | HEART RATE: 89 BPM | WEIGHT: 277 LBS | BODY MASS INDEX: 47.29 KG/M2 | SYSTOLIC BLOOD PRESSURE: 144 MMHG | OXYGEN SATURATION: 98 % | HEIGHT: 64 IN

## 2020-02-26 VITALS — SYSTOLIC BLOOD PRESSURE: 120 MMHG | DIASTOLIC BLOOD PRESSURE: 70 MMHG

## 2020-02-26 LAB
GLUCOSE BLDC GLUCOMTR-MCNC: 90
HBA1C MFR BLD HPLC: 7.1

## 2020-02-26 PROCEDURE — 83036 HEMOGLOBIN GLYCOSYLATED A1C: CPT | Mod: QW

## 2020-02-26 PROCEDURE — 82962 GLUCOSE BLOOD TEST: CPT

## 2020-02-26 PROCEDURE — 99214 OFFICE O/P EST MOD 30 MIN: CPT | Mod: 25

## 2020-02-26 NOTE — HISTORY OF PRESENT ILLNESS
[FreeTextEntry1] : 64 y.o. female with h/o Type 2 DM, HTN and hyperlipidemia here for follow up visit. Reports left breast infection in December 2019. Frustrated with weight but stable since last visit. Diet is better.  Had joined weight loss program at [facility] and lost 22 pounds in 2018. Checks FS twice daily. Fasting blood glucose levels are 130s to 140s. Before dinner FS are 106 to 148.  Taking Metformin 500 mg 2 BID and Victoza 1.8 mg SQ daily. Stopped glimepiride since May 8th 2017. Feeling good. No polyuria and no polydipsia. No SOB or CP. UTD with opthalmology (7/17/19) and no retinopathy. Does get heel cracking of feet. Saw podiatry and being treated for tinea pedis. No proteinuria. Never started statin. C/o b/l knee pain so no walking. Received steroid injection in August 2019. Planning for right knee replacement; however delayed now. Planning to follow up with nutritionist at [facility]. No exercise. \par \par For breakfast: eggs with English muffin\par For lunch: boiled egg with cheese\par For dinner: fish, steak, strings beans or salad\par Snacks: fruits with cherries or peaches but does like sweets\par \par Regarding vitamin D def, takes vitamin D3 2,000 IU daily

## 2020-02-26 NOTE — ASSESSMENT
[FreeTextEntry1] : 64 y.o. female with h/o Type 2 DM, HTN, hyperlipidemia and obesity.\par 1. Type 2 DM- Fair control with Hba1c of 7.1% today. Encouraged carbohydrate consistent diet and exercise. Will continue Metformin 1,000 mg BID and Victoza 1.8 mg SQ daily. Discussed adding SGLT-2 inhibitor; however patient prefers to hold off for now. Encouraged patient to keep in touch with blood glucose log. Will check CMP and urine microalb/cr ratio. \par 2. HTN- BP is at goal and will continue ARB. Recommend low sodium diet. \par 3. Hyperlipidemia- Recommend starting statin which is also helpful for CV risk reduction. Patient declines statin at this time. \par 4. Obesity- Encouraged diet changes and exercise. Agree with weight management consultation. Will continue GLP-1 analog and consider adding SGLT-2 inhibitor. Will check midnight salivary cortisol level. \par 5. Vitamin D def- Will check 25 vitamin D level and will continue supplement.\par 6. Bone Health- DEXA scan performed in April 2018 is normal with spine 2.1 (arthritic changes), left femoral neck 0.4 and total hip 2.1, and 1.3 radius 0.0. Average risk of fracture. Encouraged weight bearing activity. Will monitor for now and repeat DEXA scan in several years. \par \par Recommend baseline cardiology evaluation\par Follow up in 3 to 4 months\par Flu vaccine already given [Carbohydrate Consistent Diet] : carbohydrate consistent diet [Diabetes Foot Care] : diabetes foot care [Long Term Vascular Complications] : long term vascular complications of diabetes [Importance of Diet and Exercise] : importance of diet and exercise to improve glycemic control, achieve weight loss and improve cardiovascular health [Incretin Mimetic Therapy] : Risks and benefits of incretin mimetic therapy were discussed with the patient including nauseau, pancreatitis and potential risk of medullary thyroid cancer

## 2020-02-28 LAB
25(OH)D3 SERPL-MCNC: 69.7 NG/ML
ALBUMIN SERPL ELPH-MCNC: 4.3 G/DL
ALP BLD-CCNC: 54 U/L
ALT SERPL-CCNC: 37 U/L
ANION GAP SERPL CALC-SCNC: 18 MMOL/L
AST SERPL-CCNC: 32 U/L
BASOPHILS # BLD AUTO: 0.06 K/UL
BASOPHILS NFR BLD AUTO: 0.6 %
BILIRUB SERPL-MCNC: <0.2 MG/DL
BUN SERPL-MCNC: 21 MG/DL
CALCIUM SERPL-MCNC: 9.9 MG/DL
CHLORIDE SERPL-SCNC: 104 MMOL/L
CHOLEST SERPL-MCNC: 194 MG/DL
CHOLEST/HDLC SERPL: 3.4 RATIO
CO2 SERPL-SCNC: 18 MMOL/L
CREAT SERPL-MCNC: 0.84 MG/DL
CREAT SPEC-SCNC: 98 MG/DL
EOSINOPHIL # BLD AUTO: 0.16 K/UL
EOSINOPHIL NFR BLD AUTO: 1.7 %
GLUCOSE SERPL-MCNC: 99 MG/DL
HCT VFR BLD CALC: 41.6 %
HDLC SERPL-MCNC: 56 MG/DL
HGB BLD-MCNC: 13.1 G/DL
IMM GRANULOCYTES NFR BLD AUTO: 0.3 %
LDLC SERPL CALC-MCNC: 104 MG/DL
LYMPHOCYTES # BLD AUTO: 3.36 K/UL
LYMPHOCYTES NFR BLD AUTO: 35.9 %
MAN DIFF?: NORMAL
MCHC RBC-ENTMCNC: 27.5 PG
MCHC RBC-ENTMCNC: 31.5 GM/DL
MCV RBC AUTO: 87.2 FL
MICROALBUMIN 24H UR DL<=1MG/L-MCNC: <1.2 MG/DL
MICROALBUMIN/CREAT 24H UR-RTO: NORMAL MG/G
MONOCYTES # BLD AUTO: 0.7 K/UL
MONOCYTES NFR BLD AUTO: 7.5 %
NEUTROPHILS # BLD AUTO: 5.05 K/UL
NEUTROPHILS NFR BLD AUTO: 54 %
PLATELET # BLD AUTO: 323 K/UL
POTASSIUM SERPL-SCNC: 4.9 MMOL/L
PROT SERPL-MCNC: 7.1 G/DL
RBC # BLD: 4.77 M/UL
RBC # FLD: 13.6 %
SODIUM SERPL-SCNC: 140 MMOL/L
TRIGL SERPL-MCNC: 165 MG/DL
TSH SERPL-ACNC: 2.25 UIU/ML
VIT B12 SERPL-MCNC: 381 PG/ML
WBC # FLD AUTO: 9.36 K/UL

## 2020-03-03 LAB — CORTIS SAL-MCNC: NORMAL

## 2020-03-19 ENCOUNTER — RX RENEWAL (OUTPATIENT)
Age: 65
End: 2020-03-19

## 2020-04-14 ENCOUNTER — RX RENEWAL (OUTPATIENT)
Age: 65
End: 2020-04-14

## 2020-06-15 ENCOUNTER — FORM ENCOUNTER (OUTPATIENT)
Age: 65
End: 2020-06-15

## 2020-06-17 ENCOUNTER — APPOINTMENT (OUTPATIENT)
Dept: ENDOCRINOLOGY | Facility: CLINIC | Age: 65
End: 2020-06-17
Payer: MEDICARE

## 2020-06-17 VITALS
WEIGHT: 282 LBS | HEART RATE: 86 BPM | SYSTOLIC BLOOD PRESSURE: 130 MMHG | TEMPERATURE: 97.9 F | HEIGHT: 64 IN | OXYGEN SATURATION: 98 % | BODY MASS INDEX: 48.14 KG/M2 | DIASTOLIC BLOOD PRESSURE: 80 MMHG

## 2020-06-17 LAB
GLUCOSE BLDC GLUCOMTR-MCNC: 86
HBA1C MFR BLD HPLC: 7.2

## 2020-06-17 PROCEDURE — 99214 OFFICE O/P EST MOD 30 MIN: CPT | Mod: 25,GC

## 2020-06-17 PROCEDURE — 82962 GLUCOSE BLOOD TEST: CPT | Mod: GC

## 2020-06-17 PROCEDURE — 83036 HEMOGLOBIN GLYCOSYLATED A1C: CPT | Mod: GC,QW

## 2020-06-17 NOTE — ASSESSMENT
[FreeTextEntry1] : 65 y.o. female with h/o Type 2 DM, HTN, hyperlipidemia and obesity.\par 1. Type 2 DM- Fair control with Hba1c of 7.2% today. Encouraged carbohydrate consistent diet and exercise. Will continue Metformin 1,000 mg BID and Victoza 1.8 mg SQ daily. Will start Jardiance 10 mg daily. Advised about possible side effect of genital mycotic infections. \par 2. HTN- BP is at goal and will continue ARB. Recommend low sodium diet. \par 3. Hyperlipidemia- Recommend starting statin which is also helpful for CV risk reduction. Patient declines statin at this time. \par 4. Obesity- Encouraged diet changes and exercise. Agree with weight management consultation. Will continue GLP-1 analog and start SGLT-2 inhibitor. Per orthopedics, knee replacement pending weight loss. \par 5. Vitamin D def- Will check 25 vitamin D level and will continue supplement.\par 6. Bone Health- DEXA scan performed in April 2018 is normal with spine 2.1 (arthritic changes), left femoral neck 0.4 and total hip 2.1, and 1.3 radius 0.0. Average risk of fracture. Encouraged weight bearing activity. Will monitor for now and repeat DEXA scan in several years. \par \par Recommend baseline cardiology evaluation\par Follow up in 3 to 4 months\par \par Counseling: The patient was counseled on carbohydrate consistent diet, diabetes foot care, long term vascular complications of diabetes, importance of diet and exercise to improve glycemic control, achieve weight loss and improve cardiovascular health. \par Risks and benefits of incretin mimetic therapy were discussed with the patient including nausea, pancreatitis and potential risk of medullary thyroid cancer. \par

## 2020-06-17 NOTE — END OF VISIT
[] : Resident [FreeTextEntry3] : 65 y.o. female with h/o Type 2 DM with obesity, HTN, hyperlipidemia and vitamin D def.\par 1. Type 2 DM- Fair control with Hba1c of 7.2%. Encouraged diet changes and exercise. Will continue Metformin and Victoza. Will add Jardiance 10 mg daily. Discussed risks and benefits of SGLT-2 inhibitors.\par 2. HTN- BP is at goal and will continue ARB\par 3. Hyperlipidemia- Recommend statin also for CV risk reduction. Patient declines at this time.\par 4. Obesity- Will meet with RD. Encouraged diet changes and exercise. Will continue GLP-1 analog and will add SGLT-2 inhibitor.\par 5. Vitamin D def- Will check 25 vitamin D level and adjust supplement if needed\par \par Follow up in 3 to 4 months

## 2020-06-17 NOTE — REVIEW OF SYSTEMS
[Negative] : Heme/Lymph [Fatigue] : no fatigue [Recent Weight Gain (___ Lbs)] : recent weight gain: [unfilled] lbs [FreeTextEntry9] : knee pain

## 2020-06-17 NOTE — PHYSICAL EXAM
[Alert] : alert [Obese] : obese [No Acute Distress] : no acute distress [EOMI] : extra ocular movement intact [Normal Sclera/Conjunctiva] : normal sclera/conjunctiva [No Proptosis] : no proptosis [Normal Oropharynx] : the oropharynx was normal [No Respiratory Distress] : no respiratory distress [Thyroid Not Enlarged] : the thyroid was not enlarged [No Thyroid Nodules] : no palpable thyroid nodules [Normal S1, S2] : normal S1 and S2 [No Accessory Muscle Use] : no accessory muscle use [Clear to Auscultation] : lungs were clear to auscultation bilaterally [Normal Rate] : heart rate was normal [Regular Rhythm] : with a regular rhythm [No Edema] : no peripheral edema [Normal Bowel Sounds] : normal bowel sounds [Pedal Pulses Normal] : the pedal pulses are present [Not Tender] : non-tender [Not Distended] : not distended [Soft] : abdomen soft [Normal Anterior Cervical Nodes] : no anterior cervical lymphadenopathy [No Spinal Tenderness] : no spinal tenderness [Spine Straight] : spine straight [No Stigmata of Cushings Syndrome] : no stigmata of Cushings Syndrome [Normal Gait] : normal gait [Normal Reflexes] : deep tendon reflexes were 2+ and symmetric [No Rash] : no rash [No Tremors] : no tremors [No Clubbing, Cyanosis] : no clubbing  or cyanosis of the fingernails [No LAD] : no lymphadenopathy [Acanthosis Nigricans] : no acanthosis nigricans [Left Foot Was Examined] : left foot ~C was examined [Right Foot Was Examined] : right foot ~C was examined [Normal] : normal [2+] : 2+ in the dorsalis pedis [Normal Affect] : the affect was normal [FreeTextEntry1] : callus with tinea pedis [Normal Mood] : the mood was normal [FreeTextEntry5] : callus with tinea pedis

## 2020-06-17 NOTE — HISTORY OF PRESENT ILLNESS
[FreeTextEntry1] : 65 y.o. female with h/o Type 2 DM, HTN and hyperlipidemia here for follow up visit. Reports left breast infection in December 2019. Frustrated with weight and did gain weight since last visit. Snacking more. Had joined weight loss program at [place] and lost 22 pounds in 2018. Checks FS daily in the AM. Fasting blood glucose levels are 130s to 140s. Taking Metformin 500 mg 2 BID and Victoza 1.8 mg SQ daily. Stopped glimepiride since May 8th 2017. Feeling good. No polyuria and no polydipsia. No SOB or CP. Needs Ophthalmology follow up (last 7/17/19) and no retinopathy. Does get heel cracking of feet. Saw podiatry 1 year ago, and diagnosed with tinea pedis, never treated. No proteinuria. No macrovascular complications. Never started statin. C/o b/l knee pain so no walking. Received steroid injection in August 2019. Planning for right knee replacement; however delayed now. Planning to follow up with nutritionist at [facility]. No exercise. \par \par For breakfast: eggs with English muffin\par For lunch: boiled egg with cheese\par For dinner: fish, steak, strings beans or salad\par Snacks: fruits with cherries or peaches but does like sweets\par \par Regarding vitamin D def, takes vitamin D3 2,000 IU daily

## 2020-06-18 LAB
25(OH)D3 SERPL-MCNC: 68 NG/ML
ALBUMIN SERPL ELPH-MCNC: 4.3 G/DL
ALP BLD-CCNC: 54 U/L
ALT SERPL-CCNC: 40 U/L
ANION GAP SERPL CALC-SCNC: 15 MMOL/L
AST SERPL-CCNC: 31 U/L
BASOPHILS # BLD AUTO: 0.07 K/UL
BASOPHILS NFR BLD AUTO: 0.7 %
BILIRUB SERPL-MCNC: <0.2 MG/DL
BUN SERPL-MCNC: 20 MG/DL
CALCIUM SERPL-MCNC: 10.4 MG/DL
CHLORIDE SERPL-SCNC: 106 MMOL/L
CHOLEST SERPL-MCNC: 203 MG/DL
CHOLEST/HDLC SERPL: 3.1 RATIO
CO2 SERPL-SCNC: 21 MMOL/L
CREAT SERPL-MCNC: 0.81 MG/DL
CREAT SPEC-SCNC: 93 MG/DL
EOSINOPHIL # BLD AUTO: 0.25 K/UL
EOSINOPHIL NFR BLD AUTO: 2.4 %
GLUCOSE SERPL-MCNC: 94 MG/DL
HCT VFR BLD CALC: 40.9 %
HDLC SERPL-MCNC: 66 MG/DL
HGB BLD-MCNC: 12.9 G/DL
IMM GRANULOCYTES NFR BLD AUTO: 0.4 %
LDLC SERPL CALC-MCNC: 107 MG/DL
LYMPHOCYTES # BLD AUTO: 3.37 K/UL
LYMPHOCYTES NFR BLD AUTO: 32.3 %
MAN DIFF?: NORMAL
MCHC RBC-ENTMCNC: 27.3 PG
MCHC RBC-ENTMCNC: 31.5 GM/DL
MCV RBC AUTO: 86.5 FL
MICROALBUMIN 24H UR DL<=1MG/L-MCNC: <1.2 MG/DL
MICROALBUMIN/CREAT 24H UR-RTO: NORMAL MG/G
MONOCYTES # BLD AUTO: 0.83 K/UL
MONOCYTES NFR BLD AUTO: 8 %
NEUTROPHILS # BLD AUTO: 5.87 K/UL
NEUTROPHILS NFR BLD AUTO: 56.2 %
PLATELET # BLD AUTO: 282 K/UL
POTASSIUM SERPL-SCNC: 4.9 MMOL/L
PROT SERPL-MCNC: 7.2 G/DL
RBC # BLD: 4.73 M/UL
RBC # FLD: 14.4 %
SODIUM SERPL-SCNC: 143 MMOL/L
TRIGL SERPL-MCNC: 145 MG/DL
TSH SERPL-ACNC: 2.34 UIU/ML
VIT B12 SERPL-MCNC: 472 PG/ML
WBC # FLD AUTO: 10.43 K/UL

## 2020-06-23 ENCOUNTER — FORM ENCOUNTER (OUTPATIENT)
Age: 65
End: 2020-06-23

## 2020-06-30 ENCOUNTER — APPOINTMENT (OUTPATIENT)
Dept: ORTHOPEDIC SURGERY | Facility: CLINIC | Age: 65
End: 2020-06-30

## 2020-08-09 ENCOUNTER — EMERGENCY (EMERGENCY)
Facility: HOSPITAL | Age: 65
LOS: 1 days | Discharge: HOME | End: 2020-08-09
Attending: STUDENT IN AN ORGANIZED HEALTH CARE EDUCATION/TRAINING PROGRAM
Payer: MEDICARE

## 2020-08-09 VITALS
TEMPERATURE: 99 F | OXYGEN SATURATION: 96 % | WEIGHT: 274.92 LBS | HEIGHT: 63 IN | DIASTOLIC BLOOD PRESSURE: 80 MMHG | SYSTOLIC BLOOD PRESSURE: 155 MMHG | HEART RATE: 93 BPM | RESPIRATION RATE: 20 BRPM

## 2020-08-09 DIAGNOSIS — C50.919 MALIGNANT NEOPLASM OF UNSPECIFIED SITE OF UNSPECIFIED FEMALE BREAST: Chronic | ICD-10-CM

## 2020-08-09 LAB
ALBUMIN SERPL ELPH-MCNC: 4 G/DL — SIGNIFICANT CHANGE UP (ref 3.3–5)
ALP SERPL-CCNC: 44 U/L — SIGNIFICANT CHANGE UP (ref 40–120)
ALT FLD-CCNC: 29 U/L — SIGNIFICANT CHANGE UP (ref 10–45)
ANION GAP SERPL CALC-SCNC: 16 MMOL/L — SIGNIFICANT CHANGE UP (ref 5–17)
APPEARANCE UR: CLEAR — SIGNIFICANT CHANGE UP
APTT BLD: 28.4 SEC — SIGNIFICANT CHANGE UP (ref 27.5–35.5)
AST SERPL-CCNC: 30 U/L — SIGNIFICANT CHANGE UP (ref 10–40)
BACTERIA # UR AUTO: NEGATIVE — SIGNIFICANT CHANGE UP
BASE EXCESS BLDV CALC-SCNC: -2.8 MMOL/L — LOW (ref -2–2)
BASOPHILS # BLD AUTO: 0.06 K/UL — SIGNIFICANT CHANGE UP (ref 0–0.2)
BASOPHILS NFR BLD AUTO: 0.4 % — SIGNIFICANT CHANGE UP (ref 0–2)
BILIRUB SERPL-MCNC: <0.1 MG/DL — LOW (ref 0.2–1.2)
BILIRUB UR-MCNC: NEGATIVE — SIGNIFICANT CHANGE UP
BUN SERPL-MCNC: 29 MG/DL — HIGH (ref 7–23)
CA-I SERPL-SCNC: 1.33 MMOL/L — HIGH (ref 1.12–1.3)
CALCIUM SERPL-MCNC: 9.7 MG/DL — SIGNIFICANT CHANGE UP (ref 8.4–10.5)
CHLORIDE BLDV-SCNC: 113 MMOL/L — HIGH (ref 96–108)
CHLORIDE SERPL-SCNC: 106 MMOL/L — SIGNIFICANT CHANGE UP (ref 96–108)
CO2 BLDV-SCNC: 24 MMOL/L — SIGNIFICANT CHANGE UP (ref 22–30)
CO2 SERPL-SCNC: 19 MMOL/L — LOW (ref 22–31)
COLOR SPEC: SIGNIFICANT CHANGE UP
CREAT SERPL-MCNC: 0.83 MG/DL — SIGNIFICANT CHANGE UP (ref 0.5–1.3)
D DIMER BLD IA.RAPID-MCNC: 223 NG/ML DDU — SIGNIFICANT CHANGE UP
DIFF PNL FLD: NEGATIVE — SIGNIFICANT CHANGE UP
EOSINOPHIL # BLD AUTO: 0.16 K/UL — SIGNIFICANT CHANGE UP (ref 0–0.5)
EOSINOPHIL NFR BLD AUTO: 1.2 % — SIGNIFICANT CHANGE UP (ref 0–6)
EPI CELLS # UR: 3 /HPF — SIGNIFICANT CHANGE UP
GAS PNL BLDV: 142 MMOL/L — SIGNIFICANT CHANGE UP (ref 135–145)
GAS PNL BLDV: SIGNIFICANT CHANGE UP
GLUCOSE BLDV-MCNC: 152 MG/DL — HIGH (ref 70–99)
GLUCOSE SERPL-MCNC: 155 MG/DL — HIGH (ref 70–99)
GLUCOSE UR QL: ABNORMAL
HCO3 BLDV-SCNC: 22 MMOL/L — SIGNIFICANT CHANGE UP (ref 21–29)
HCT VFR BLD CALC: 39.3 % — SIGNIFICANT CHANGE UP (ref 34.5–45)
HCT VFR BLDA CALC: 38 % — LOW (ref 39–50)
HGB BLD CALC-MCNC: 12.5 G/DL — SIGNIFICANT CHANGE UP (ref 11.5–15.5)
HGB BLD-MCNC: 12.4 G/DL — SIGNIFICANT CHANGE UP (ref 11.5–15.5)
IMM GRANULOCYTES NFR BLD AUTO: 0.3 % — SIGNIFICANT CHANGE UP (ref 0–1.5)
INR BLD: 0.94 RATIO — SIGNIFICANT CHANGE UP (ref 0.88–1.16)
KETONES UR-MCNC: NEGATIVE — SIGNIFICANT CHANGE UP
LACTATE BLDV-MCNC: 2.5 MMOL/L — HIGH (ref 0.7–2)
LEUKOCYTE ESTERASE UR-ACNC: NEGATIVE — SIGNIFICANT CHANGE UP
LIDOCAIN IGE QN: 105 U/L — HIGH (ref 7–60)
LYMPHOCYTES # BLD AUTO: 2.74 K/UL — SIGNIFICANT CHANGE UP (ref 1–3.3)
LYMPHOCYTES # BLD AUTO: 20.4 % — SIGNIFICANT CHANGE UP (ref 13–44)
MCHC RBC-ENTMCNC: 27.8 PG — SIGNIFICANT CHANGE UP (ref 27–34)
MCHC RBC-ENTMCNC: 31.6 GM/DL — LOW (ref 32–36)
MCV RBC AUTO: 88.1 FL — SIGNIFICANT CHANGE UP (ref 80–100)
MONOCYTES # BLD AUTO: 1.25 K/UL — HIGH (ref 0–0.9)
MONOCYTES NFR BLD AUTO: 9.3 % — SIGNIFICANT CHANGE UP (ref 2–14)
NEUTROPHILS # BLD AUTO: 9.15 K/UL — HIGH (ref 1.8–7.4)
NEUTROPHILS NFR BLD AUTO: 68.4 % — SIGNIFICANT CHANGE UP (ref 43–77)
NITRITE UR-MCNC: NEGATIVE — SIGNIFICANT CHANGE UP
NRBC # BLD: 0 /100 WBCS — SIGNIFICANT CHANGE UP (ref 0–0)
PCO2 BLDV: 43 MMHG — SIGNIFICANT CHANGE UP (ref 35–50)
PH BLDV: 7.34 — LOW (ref 7.35–7.45)
PH UR: 5.5 — SIGNIFICANT CHANGE UP (ref 5–8)
PLATELET # BLD AUTO: 244 K/UL — SIGNIFICANT CHANGE UP (ref 150–400)
PO2 BLDV: 31 MMHG — SIGNIFICANT CHANGE UP (ref 25–45)
POTASSIUM BLDV-SCNC: 4 MMOL/L — SIGNIFICANT CHANGE UP (ref 3.5–5.3)
POTASSIUM SERPL-MCNC: 4.7 MMOL/L — SIGNIFICANT CHANGE UP (ref 3.5–5.3)
POTASSIUM SERPL-SCNC: 4.7 MMOL/L — SIGNIFICANT CHANGE UP (ref 3.5–5.3)
PROT SERPL-MCNC: 7 G/DL — SIGNIFICANT CHANGE UP (ref 6–8.3)
PROT UR-MCNC: NEGATIVE — SIGNIFICANT CHANGE UP
PROTHROM AB SERPL-ACNC: 11.2 SEC — SIGNIFICANT CHANGE UP (ref 10.6–13.6)
RBC # BLD: 4.46 M/UL — SIGNIFICANT CHANGE UP (ref 3.8–5.2)
RBC # FLD: 14.4 % — SIGNIFICANT CHANGE UP (ref 10.3–14.5)
RBC CASTS # UR COMP ASSIST: 1 /HPF — SIGNIFICANT CHANGE UP (ref 0–4)
SAO2 % BLDV: 51 % — LOW (ref 67–88)
SODIUM SERPL-SCNC: 141 MMOL/L — SIGNIFICANT CHANGE UP (ref 135–145)
SP GR SPEC: 1.03 — HIGH (ref 1.01–1.02)
TROPONIN T, HIGH SENSITIVITY RESULT: 23 NG/L — SIGNIFICANT CHANGE UP (ref 0–51)
UROBILINOGEN FLD QL: NEGATIVE — SIGNIFICANT CHANGE UP
WBC # BLD: 13.4 K/UL — HIGH (ref 3.8–10.5)
WBC # FLD AUTO: 13.4 K/UL — HIGH (ref 3.8–10.5)
WBC UR QL: 3 /HPF — SIGNIFICANT CHANGE UP (ref 0–5)

## 2020-08-09 PROCEDURE — 71046 X-RAY EXAM CHEST 2 VIEWS: CPT | Mod: 26

## 2020-08-09 PROCEDURE — 93010 ELECTROCARDIOGRAM REPORT: CPT

## 2020-08-09 PROCEDURE — 99285 EMERGENCY DEPT VISIT HI MDM: CPT

## 2020-08-09 RX ORDER — SODIUM CHLORIDE 9 MG/ML
1000 INJECTION INTRAMUSCULAR; INTRAVENOUS; SUBCUTANEOUS ONCE
Refills: 0 | Status: COMPLETED | OUTPATIENT
Start: 2020-08-09 | End: 2020-08-09

## 2020-08-09 RX ORDER — ACETAMINOPHEN 500 MG
975 TABLET ORAL ONCE
Refills: 0 | Status: COMPLETED | OUTPATIENT
Start: 2020-08-09 | End: 2020-08-09

## 2020-08-09 RX ADMIN — Medication 975 MILLIGRAM(S): at 22:57

## 2020-08-09 NOTE — ED PROVIDER NOTE - OBJECTIVE STATEMENT
66yo F pmh DM presents for evaluation of pleuritis L posterior ?rib/flank pain x few hours today after driving. Nonradiating. Worse w bumps in the car enroute. Worse w movement and deep inspiration. Associated w 1 episode of vomiting today. Had R LBP in sciatic region 2 days ago, now resolved. Denies chest pain, abd pain, dysuria, hematuria, fever, chills, sob, cough, trauma. No LE pain/swelling, h/o VTE, motor/sensory changes, weakness. Pt concerned about cardiac etiology after researching sx online. 66yo F pmh DM and HTN, presents for evaluation of left flank pain x few hours today after driving. Nonradiating. Worse w bumps in the car enroute. Worse w movement and deep inspiration. Associated w 1 episode of vomiting 3 days ago with some nausea and vomiting earlier today. Had R flank pain 2 days ago, now resolved. Denies chest pain, abd pain, dysuria, hematuria, fever, chills, sob, cough, trauma. No LE pain/swelling/paresthesias, h/o VTE, motor/sensory changes, weakness. Pt concerned about cardiac etiology after researching sx online.

## 2020-08-09 NOTE — ED PROVIDER NOTE - PROGRESS NOTE DETAILS
[place] DO: pt reassessed, states that her pain is controlled with tylenol, comfortable appearing, offered further meds such as muscle relaxant or anti-inflammatory to help with pain relief that may occur but pt declined, pt states pain is only there with movement and deep inhalation, but ddimer negative, not hypoxic, no indication to pursue CTA at this time, ct a/p without explanation of symptoms, pt informed of T10 and lumbar degenerative changes, pt denies any numbness of the LE , new weakness of the LE or GI/ complaints, pt does have chronic right knee pain. Pt given strict return precautions, pt instructed to f/u with pmd regarding symptoms and monitor for improvement and return for worsening [place] DO: pt with significant improvement with lidoderm patch, ambulatory without difficulty, plan for dc home.

## 2020-08-09 NOTE — ED ADULT NURSE NOTE - NSIMPLEMENTINTERV_GEN_ALL_ED
Implemented All Universal Safety Interventions:  [place] to call system. Call bell, personal items and telephone within reach. Instruct patient to call for assistance. Room bathroom lighting operational. Non-slip footwear when patient is off stretcher. Physically safe environment: no spills, clutter or unnecessary equipment. Stretcher in lowest position, wheels locked, appropriate side rails in place.

## 2020-08-09 NOTE — ED PROVIDER NOTE - PATIENT PORTAL LINK FT
You can access the FollowMyHealth Patient Portal offered by [facility] by registering at the following website: [email or web address]. By joining [facility]’s FollowMyHealth portal, you will also be able to view your health information using other applications (apps) compatible with our system.

## 2020-08-09 NOTE — ED PROVIDER NOTE - CARE PLAN
Principal Discharge DX:	Acute left-sided thoracic back pain  Assessment and plan of treatment:	Acute pleuritic L thoracic back pain x few hours  -ECG  -Labs  -CXR  -UA  -Consider CT  -Analgesia prn Principal Discharge DX:	Acute left-sided thoracic back pain  Assessment and plan of treatment:	Acute pleuritic L thoracic back pain x few hours, suspect MSK however will r/o more significant etiologies  -ECG  -Labs  -CXR  -UA  -Consider CT  -Analgesia prn

## 2020-08-09 NOTE — ED PROVIDER NOTE - PSH
Breast cancer  2 separate breast cancers, 2008 left breast lumpectomy with RT and NO chemotherapy and  with left breast lumpectomy with NO chemo or RT, followed by tamoxifen   delivery delivered   &

## 2020-08-09 NOTE — ED PROVIDER NOTE - ATTENDING CONTRIBUTION TO CARE
65F hx DM, HTN, p/w left scapular pain persistent,  intermittently worse with movement, worse with deep inspiration, associated with nausea and vomiting x 1 earlier today, initially had right sided flank pain 2-3 days ago w/ associated vomiting but that pain has since resolved and now pain is left sided. No fevers. No cough. No dysuria or hematuria. Hx of c-s. Notes normal BMs, nonbloody stools. No abd pain. No sob. But has increased fatigue with exertion 2/2 to pain. No heavy lifting/straining. No paresthesias or weakness.     Gen: AAOx3, NAD, well appearing.   Head: NCAT  ENT: Airway patent, moist mucous membranes, nasal passageways clear  Cardiac: Normal rate, normal rhythm, no murmurs   Respiratory: Lungs CTA B/L  Gastrointestinal: Abdomen soft, nontender, nondistended, no rebound, no guarding  MSK: No gross abnormalities, FROM of all four extremities, no edema, + mild TTP at the left lateral -posterior rib at ribs 6-7.   HEME: Extremities warm, pulses intact and symmetrical in all four extremities  Skin: No rashes, no lesions  Neuro: No gross neurologic deficits    MDM: 65F w/ pleuritic left sided pain, some features of colic that may suggest nephrolithiasis, but migration of pain to contralateral side is unusual for renal colic. Pleuritic component and body habitus suggests PE as possibility though pt without hypoxia, low risk, low wells score- will check ddimer. plan for trop if this is atypical presentation for acs. ekg sinus without signs of ischemia. If ddimer + then will cta, will start w. cxr to eval pulm structures, unlikely pna or ptx. Will consider ct a/p pending UA. I have personally performed a face to face medical and diagnostic evaluation of the patient. I have discussed with and reviewed the ACP's note and agree with the History, ROS, Physical Exam and MDM unless otherwise indicated. A brief summary of my personal evaluation and impression can be found below.    This patient is a 65F hx DM, HTN, p/w left scapular pain onset x 1 day, persistent, sharp, intermittently worse with movement, worse with deep inspiration, associated with nausea and vomiting x 1 earlier today, initially had right sided flank pain 2-3 days ago w/ associated vomiting x1 similar to today's pain. Pt reports that the bumps in the car and sudden movements makes her pain worse. No fevers. No cough. No chest pain. No dysuria or hematuria. Notes normal BMs, nonbloody stools. No abd pain. No sob. But has increased fatigue with exertion 2/2 to pain. No heavy lifting/straining. No paresthesias or weakness. No hx of vte. Pt does have hx of breast ca but had RT/lumpectomy and in remission since 2014. No fam hx of  CAD. No fam hx of vte. No dizziness or near syncope.      Gen: AAOx3, NAD, well appearing.   Head: NCAT  ENT: Airway patent, moist mucous membranes, nasal passageways clear  Cardiac: Normal rate, normal rhythm, no murmurs   Respiratory: Lungs CTA B/L  Gastrointestinal: Abdomen soft, nontender, nondistended, no rebound, no guarding  MSK: No gross abnormalities, FROM of all four extremities, no edema, + mild TTP at the left lateral -posterior rib at ribs 6-7. no midline c-t-l spine ttp. + chronic right knee pain, No LE weakness.   HEME: Extremities warm, pulses intact and symmetrical in all four extremities  Skin: No rashes, no lesions  Neuro: No gross neurologic deficits    MDM: 65F w/ pleuritic left sided pain, some features of colic that may suggest nephrolithiasis, but migration of pain to contralateral side is unusual for renal colic. Pleuritic component and body habitus suggests PE as possibility though pt without hypoxia, no active malignancy, no tachycardia, no hx vte, pt would be low risk for PE- will check ddimer. plan for troponin as there is possibility that this is atypical presentation for acs. ekg sinus without signs of ischemia. If ddimer + then will cta chest, will start w. cxr to eval pulm structures, unlikely pna or ptx. Will consider ct a/p pending UA. if UA without hematuria, plan for ct a/p with IV contrast to ensure no intra-abd abnormalities such as renal infarct as cause for pain. Will provide symptomatic relief and reassess.

## 2020-08-09 NOTE — ED PROVIDER NOTE - PMH
Arthropathy  b/l knees  Bleeding  postpostmenopausal bleeding in July 2014  Breast cancer  2 separate cancers -- 2008 left lumpectomy with RT and 2012 with left lumpectomy and NO RT or chemotherapy followed by tamoxifen  Diabetes  type II diagnosed approximately 2007 -- doesn't do finger sticks  Endometrial thickening on ultra sound  2014  Morbid obesity    Nephrolithiasis  passed on their own  PVD (peripheral vascular disease)    Snoring  TAM precautions -- responds affirmatively to STOP BANG questionnaire -- admits to loud snoring and daytime somnolence; age > 50; h/o htn; bmi > 35; neck circumference > 17 inches  Vitamin D deficiency

## 2020-08-09 NOTE — ED ADULT NURSE NOTE - OBJECTIVE STATEMENT
Pt is a 64 y/o female pmhx of T2DM, Obesity & PVD presents to the ED complaining of back pain. Pain began 2 days ago and was on the R lower back, earlier today it moved to the L lower back & now it is in the L upper back. Pt describes pain as intermittent & sharp, does not hurt when she is sitting still but when she takes a deep breath or goes over a bump in the car it hurts. Had one episode of emesis earlier today. Currently denies pain radiation, n/v/d, shortness of breath, chest pain, fever, chills, dysuria, trauma.

## 2020-08-09 NOTE — ED PROVIDER NOTE - NSFOLLOWUPINSTRUCTIONS_ED_ALL_ED_FT
You were seen today for left flank pain. We did a chest xray, CT scan of your abdomen and pelvis, and we checked a urine and labwork, as well as an EKG and cardiac enzymes. These tests were all stable and did not explain your symptoms today. You had a d-dimer to evaluate for risk of blood clot and it was within normal.       You did have a small white count which is a marker of inflammation. You also had a lipase of 105 which is related to the pancreas that can be reactive or indicate slight inflammation.     You did have incidental findings of significant narrowing of T10 and L2-L3 of the spine which may contribute to nerve impingement or musculoskeletal pain.     Please follow up with your primary care doctor this week.    Continue your naproxen at home as directed by your doctor, as needed for pain with food and plenty of water for up to 5 days  Take tylenol 650 mg every 4-6 hours as needed for pain, max daily dose 3250 mg/day.     Return to the emergency department for blood in urine, worsening loss of bladder control/loss of bowel control, fever, vomiting, inability to walk, weakness/numbness, chest pain, difficulty breathing, or if you have any new or changing symptoms or concerns.

## 2020-08-09 NOTE — ED PROVIDER NOTE - PLAN OF CARE
Acute pleuritic L thoracic back pain x few hours  -ECG  -Labs  -CXR  -UA  -Consider CT  -Analgesia prn Acute pleuritic L thoracic back pain x few hours, suspect MSK however will r/o more significant etiologies  -ECG  -Labs  -CXR  -UA  -Consider CT  -Analgesia prn

## 2020-08-10 VITALS
TEMPERATURE: 99 F | HEART RATE: 76 BPM | OXYGEN SATURATION: 99 % | SYSTOLIC BLOOD PRESSURE: 124 MMHG | DIASTOLIC BLOOD PRESSURE: 64 MMHG | RESPIRATION RATE: 20 BRPM

## 2020-08-10 LAB
BASE EXCESS BLDV CALC-SCNC: -4.1 MMOL/L — LOW (ref -2–2)
CA-I SERPL-SCNC: 1.3 MMOL/L — SIGNIFICANT CHANGE UP (ref 1.12–1.3)
CHLORIDE BLDV-SCNC: 112 MMOL/L — HIGH (ref 96–108)
CO2 BLDV-SCNC: 23 MMOL/L — SIGNIFICANT CHANGE UP (ref 22–30)
GAS PNL BLDV: 141 MMOL/L — SIGNIFICANT CHANGE UP (ref 135–145)
GAS PNL BLDV: SIGNIFICANT CHANGE UP
GLUCOSE BLDV-MCNC: 114 MG/DL — HIGH (ref 70–99)
HCO3 BLDV-SCNC: 21 MMOL/L — SIGNIFICANT CHANGE UP (ref 21–29)
HCT VFR BLDA CALC: 38 % — LOW (ref 39–50)
HGB BLD CALC-MCNC: 12.3 G/DL — SIGNIFICANT CHANGE UP (ref 11.5–15.5)
LACTATE BLDV-MCNC: 2.4 MMOL/L — HIGH (ref 0.7–2)
PCO2 BLDV: 43 MMHG — SIGNIFICANT CHANGE UP (ref 35–50)
PH BLDV: 7.32 — LOW (ref 7.35–7.45)
PO2 BLDV: 29 MMHG — SIGNIFICANT CHANGE UP (ref 25–45)
POTASSIUM BLDV-SCNC: 4.2 MMOL/L — SIGNIFICANT CHANGE UP (ref 3.5–5.3)
SAO2 % BLDV: 47 % — LOW (ref 67–88)
TROPONIN T, HIGH SENSITIVITY RESULT: 22 NG/L — SIGNIFICANT CHANGE UP (ref 0–51)

## 2020-08-10 PROCEDURE — 84132 ASSAY OF SERUM POTASSIUM: CPT

## 2020-08-10 PROCEDURE — 84295 ASSAY OF SERUM SODIUM: CPT

## 2020-08-10 PROCEDURE — 74177 CT ABD & PELVIS W/CONTRAST: CPT | Mod: 26

## 2020-08-10 PROCEDURE — 85027 COMPLETE CBC AUTOMATED: CPT

## 2020-08-10 PROCEDURE — 81001 URINALYSIS AUTO W/SCOPE: CPT

## 2020-08-10 PROCEDURE — 93005 ELECTROCARDIOGRAM TRACING: CPT

## 2020-08-10 PROCEDURE — 80053 COMPREHEN METABOLIC PANEL: CPT

## 2020-08-10 PROCEDURE — 82330 ASSAY OF CALCIUM: CPT

## 2020-08-10 PROCEDURE — 84484 ASSAY OF TROPONIN QUANT: CPT

## 2020-08-10 PROCEDURE — 85610 PROTHROMBIN TIME: CPT

## 2020-08-10 PROCEDURE — 85379 FIBRIN DEGRADATION QUANT: CPT

## 2020-08-10 PROCEDURE — 83605 ASSAY OF LACTIC ACID: CPT

## 2020-08-10 PROCEDURE — 83690 ASSAY OF LIPASE: CPT

## 2020-08-10 PROCEDURE — 74177 CT ABD & PELVIS W/CONTRAST: CPT

## 2020-08-10 PROCEDURE — 99284 EMERGENCY DEPT VISIT MOD MDM: CPT | Mod: 25

## 2020-08-10 PROCEDURE — 85730 THROMBOPLASTIN TIME PARTIAL: CPT

## 2020-08-10 PROCEDURE — 82435 ASSAY OF BLOOD CHLORIDE: CPT

## 2020-08-10 PROCEDURE — 82565 ASSAY OF CREATININE: CPT

## 2020-08-10 PROCEDURE — 71046 X-RAY EXAM CHEST 2 VIEWS: CPT

## 2020-08-10 PROCEDURE — 85014 HEMATOCRIT: CPT

## 2020-08-10 PROCEDURE — 82803 BLOOD GASES ANY COMBINATION: CPT

## 2020-08-10 PROCEDURE — 87086 URINE CULTURE/COLONY COUNT: CPT

## 2020-08-10 PROCEDURE — 82947 ASSAY GLUCOSE BLOOD QUANT: CPT

## 2020-08-10 RX ORDER — LIDOCAINE 4 G/100G
1 CREAM TOPICAL ONCE
Refills: 0 | Status: COMPLETED | OUTPATIENT
Start: 2020-08-10 | End: 2020-08-10

## 2020-08-10 RX ORDER — OXYCODONE HYDROCHLORIDE 5 MG/1
1 TABLET ORAL
Qty: 7 | Refills: 0
Start: 2020-08-10 | End: 2020-08-11

## 2020-08-10 RX ADMIN — SODIUM CHLORIDE 1000 MILLILITER(S): 9 INJECTION INTRAMUSCULAR; INTRAVENOUS; SUBCUTANEOUS at 00:04

## 2020-08-10 RX ADMIN — LIDOCAINE 1 PATCH: 4 CREAM TOPICAL at 01:26

## 2020-08-11 LAB
CULTURE RESULTS: SIGNIFICANT CHANGE UP
SPECIMEN SOURCE: SIGNIFICANT CHANGE UP

## 2020-09-24 ENCOUNTER — RX RENEWAL (OUTPATIENT)
Age: 65
End: 2020-09-24

## 2020-10-28 ENCOUNTER — APPOINTMENT (OUTPATIENT)
Dept: ENDOCRINOLOGY | Facility: CLINIC | Age: 65
End: 2020-10-28
Payer: MEDICARE

## 2020-10-28 VITALS
BODY MASS INDEX: 47.63 KG/M2 | WEIGHT: 279 LBS | OXYGEN SATURATION: 97 % | HEIGHT: 64 IN | DIASTOLIC BLOOD PRESSURE: 70 MMHG | HEART RATE: 92 BPM | TEMPERATURE: 98.2 F | SYSTOLIC BLOOD PRESSURE: 130 MMHG

## 2020-10-28 VITALS — SYSTOLIC BLOOD PRESSURE: 110 MMHG | DIASTOLIC BLOOD PRESSURE: 64 MMHG

## 2020-10-28 LAB
GLUCOSE BLDC GLUCOMTR-MCNC: 91
HBA1C MFR BLD HPLC: 7

## 2020-10-28 PROCEDURE — 99214 OFFICE O/P EST MOD 30 MIN: CPT | Mod: 25

## 2020-10-28 PROCEDURE — 82962 GLUCOSE BLOOD TEST: CPT

## 2020-10-28 PROCEDURE — 83036 HEMOGLOBIN GLYCOSYLATED A1C: CPT | Mod: QW

## 2020-10-28 NOTE — ASSESSMENT
[FreeTextEntry1] : 65 y.o. female with h/o Type 2 DM, HTN, hyperlipidemia and obesity.\par 1. Type 2 DM- Fair control with Hba1c of 7% today. Encouraged carbohydrate consistent diet and exercise. Will continue Metformin 1,000 mg BID and Victoza 1.8 mg SQ daily. Will continue Jardiance 10 mg 1/2 tab daily. Advised about possible side effect of genital mycotic infections. Will check CMP and urine microalb/cr ratio. \par 2. HTN- BP is at goal and will continue ARB.  \par 3. Hyperlipidemia- Recommend starting statin which is also helpful for CV risk reduction. Patient declines statin at this time. Will check lipids. \par 4. Obesity- Encouraged diet changes and exercise. Agree with weight management consultation. Will continue GLP-1 analog and SGLT-2 inhibitor. Per orthopedics, knee replacement pending weight loss. \par 5. Vitamin D def- Will check 25 vitamin D level and will continue supplement.\par 6. Bone Health- DEXA scan performed in April 2018 is normal with spine 2.1 (arthritic changes), left femoral neck 0.4 and total hip 2.1, and 1.3 radius 0.0. Average risk of fracture. Encouraged weight bearing activity. Will monitor for now and repeat DEXA scan in several years. \par \par Recommend baseline cardiology evaluation\par Follow up in 3 to 4 months\par \par Counseling: The patient was counseled on carbohydrate consistent diet, diabetes foot care, long term vascular complications of diabetes, importance of diet and exercise to improve glycemic control, achieve weight loss and improve cardiovascular health. \par Risks and benefits of incretin mimetic therapy were discussed with the patient including nausea, pancreatitis and potential risk of medullary thyroid cancer. \par

## 2020-10-28 NOTE — PHYSICAL EXAM
[Alert] : alert [Obese] : obese [No Acute Distress] : no acute distress [Normal Sclera/Conjunctiva] : normal sclera/conjunctiva [EOMI] : extra ocular movement intact [Normal Oropharynx] : the oropharynx was normal [No LAD] : no lymphadenopathy [Thyroid Not Enlarged] : the thyroid was not enlarged [No Thyroid Nodules] : no palpable thyroid nodules [No Respiratory Distress] : no respiratory distress [No Accessory Muscle Use] : no accessory muscle use [Clear to Auscultation] : lungs were clear to auscultation bilaterally [Normal S1, S2] : normal S1 and S2 [Normal Rate] : heart rate was normal [Regular Rhythm] : with a regular rhythm [No Edema] : no peripheral edema [Pedal Pulses Normal] : the pedal pulses are present [Normal Bowel Sounds] : normal bowel sounds [Not Tender] : non-tender [Not Distended] : not distended [Soft] : abdomen soft [Normal Anterior Cervical Nodes] : no anterior cervical lymphadenopathy [No Spinal Tenderness] : no spinal tenderness [Spine Straight] : spine straight [No Stigmata of Cushings Syndrome] : no stigmata of Cushings Syndrome [No Clubbing, Cyanosis] : no clubbing  or cyanosis of the fingernails [No Rash] : no rash [Right Foot Was Examined] : right foot ~C was examined [Left Foot Was Examined] : left foot ~C was examined [Normal] : normal [2+] : 2+ in the dorsalis pedis [Normal Reflexes] : deep tendon reflexes were 2+ and symmetric [No Tremors] : no tremors [Normal Affect] : the affect was normal [Normal Mood] : the mood was normal [Acanthosis Nigricans] : no acanthosis nigricans [Diminished Throughout Both Feet] : normal tactile sensation with monofilament testing throughout both feet [FreeTextEntry1] : callus with tinea pedis [FreeTextEntry5] : callus with tinea pedis

## 2020-10-28 NOTE — HISTORY OF PRESENT ILLNESS
[FreeTextEntry1] : 65 y.o. female with h/o Type 2 DM, HTN and hyperlipidemia here for follow up visit. Reports left breast infection in December 2019. Frustrated with weight and did gain weight since last visit. Snacking more. Had joined weight loss program at [place] and lost 22 pounds in 2018. Checks FS daily in the AM. Fasting blood glucose levels are 120s to 142. Taking Metformin 500 mg 2 BID and Victoza 1.8 mg SQ daily. Taking Jardiance 10 mg 1/2 tab daily (reduced secondary to frequent urination) Stopped glimepiride since May 8th 2017. Feeling good. No polyuria and no polydipsia. No SOB or CP. Needs Ophthalmology follow up (last 7/17/19) and no retinopathy. Reports change in vision. Does get heel cracking of feet. Saw podiatry 2 to 3 months ago, and diagnosed with tinea pedis. No proteinuria. No macrovascular complications. Has declined statin. C/o b/l knee pain so no walking. Received steroid injection in August 2019. Planning for right knee replacement; however delayed now. Planning to follow up with nutritionist at [facility]. No exercise. \par \par For breakfast: eggs with English muffin\par For lunch: boiled egg with cheese\par For dinner: fish, steak, strings beans or salad\par Snacks: fruits with cherries or peaches but does like sweets\par \par Regarding vitamin D def, takes vitamin D3 2,000 IU daily

## 2020-10-28 NOTE — REVIEW OF SYSTEMS
[Recent Weight Gain (___ Lbs)] : recent weight gain: [unfilled] lbs [Negative] : Heme/Lymph [Fatigue] : no fatigue [FreeTextEntry9] : knee pain

## 2020-10-30 LAB
25(OH)D3 SERPL-MCNC: 56.3 NG/ML
ALBUMIN SERPL ELPH-MCNC: 4.1 G/DL
ALP BLD-CCNC: 56 U/L
ALT SERPL-CCNC: 34 U/L
ANION GAP SERPL CALC-SCNC: 17 MMOL/L
AST SERPL-CCNC: 26 U/L
BASOPHILS # BLD AUTO: 0.08 K/UL
BASOPHILS NFR BLD AUTO: 0.7 %
BILIRUB SERPL-MCNC: <0.2 MG/DL
BUN SERPL-MCNC: 25 MG/DL
CALCIUM SERPL-MCNC: 10.3 MG/DL
CHLORIDE SERPL-SCNC: 107 MMOL/L
CHOLEST SERPL-MCNC: 202 MG/DL
CO2 SERPL-SCNC: 18 MMOL/L
CREAT SERPL-MCNC: 0.85 MG/DL
CREAT SPEC-SCNC: 75 MG/DL
EOSINOPHIL # BLD AUTO: 0.24 K/UL
EOSINOPHIL NFR BLD AUTO: 2.1 %
GLUCOSE SERPL-MCNC: 104 MG/DL
HCT VFR BLD CALC: 44.8 %
HDLC SERPL-MCNC: 60 MG/DL
HGB BLD-MCNC: 13.6 G/DL
IMM GRANULOCYTES NFR BLD AUTO: 0.4 %
LDLC SERPL CALC-MCNC: 108 MG/DL
LYMPHOCYTES # BLD AUTO: 3.45 K/UL
LYMPHOCYTES NFR BLD AUTO: 30.5 %
MAN DIFF?: NORMAL
MCHC RBC-ENTMCNC: 27.3 PG
MCHC RBC-ENTMCNC: 30.4 GM/DL
MCV RBC AUTO: 89.8 FL
MICROALBUMIN 24H UR DL<=1MG/L-MCNC: <1.2 MG/DL
MICROALBUMIN/CREAT 24H UR-RTO: NORMAL MG/G
MONOCYTES # BLD AUTO: 0.78 K/UL
MONOCYTES NFR BLD AUTO: 6.9 %
NEUTROPHILS # BLD AUTO: 6.73 K/UL
NEUTROPHILS NFR BLD AUTO: 59.4 %
NONHDLC SERPL-MCNC: 142 MG/DL
PLATELET # BLD AUTO: 321 K/UL
POTASSIUM SERPL-SCNC: 4.8 MMOL/L
PROT SERPL-MCNC: 7.2 G/DL
RBC # BLD: 4.99 M/UL
RBC # FLD: 13.7 %
SODIUM SERPL-SCNC: 142 MMOL/L
TRIGL SERPL-MCNC: 170 MG/DL
TSH SERPL-ACNC: 2.33 UIU/ML
VIT B12 SERPL-MCNC: 445 PG/ML
WBC # FLD AUTO: 11.33 K/UL

## 2021-02-09 ENCOUNTER — APPOINTMENT (OUTPATIENT)
Dept: PULMONOLOGY | Facility: CLINIC | Age: 66
End: 2021-02-09

## 2021-02-22 ENCOUNTER — RX RENEWAL (OUTPATIENT)
Age: 66
End: 2021-02-22

## 2021-03-25 ENCOUNTER — RX RENEWAL (OUTPATIENT)
Age: 66
End: 2021-03-25

## 2021-03-26 ENCOUNTER — APPOINTMENT (OUTPATIENT)
Dept: ENDOCRINOLOGY | Facility: CLINIC | Age: 66
End: 2021-03-26
Payer: MEDICARE

## 2021-03-26 VITALS
HEART RATE: 84 BPM | BODY MASS INDEX: 46.1 KG/M2 | TEMPERATURE: 98.1 F | WEIGHT: 270 LBS | HEIGHT: 64 IN | DIASTOLIC BLOOD PRESSURE: 64 MMHG | OXYGEN SATURATION: 97 % | SYSTOLIC BLOOD PRESSURE: 118 MMHG

## 2021-03-26 DIAGNOSIS — Z20.828 CONTACT WITH AND (SUSPECTED) EXPOSURE TO OTHER VIRAL COMMUNICABLE DISEASES: ICD-10-CM

## 2021-03-26 LAB
GLUCOSE BLDC GLUCOMTR-MCNC: 92
HBA1C MFR BLD HPLC: 7

## 2021-03-26 PROCEDURE — 99214 OFFICE O/P EST MOD 30 MIN: CPT | Mod: 25

## 2021-03-26 PROCEDURE — 82962 GLUCOSE BLOOD TEST: CPT

## 2021-03-26 PROCEDURE — 83036 HEMOGLOBIN GLYCOSYLATED A1C: CPT | Mod: QW

## 2021-03-26 RX ORDER — ATORVASTATIN CALCIUM 10 MG/1
10 TABLET, FILM COATED ORAL DAILY
Qty: 1 | Refills: 1 | Status: DISCONTINUED | COMMUNITY
Start: 2017-01-12 | End: 2021-03-26

## 2021-03-27 LAB
25(OH)D3 SERPL-MCNC: 81.6 NG/ML
ALBUMIN SERPL ELPH-MCNC: 4.2 G/DL
ALP BLD-CCNC: 56 U/L
ALT SERPL-CCNC: 39 U/L
ANION GAP SERPL CALC-SCNC: 13 MMOL/L
AST SERPL-CCNC: 32 U/L
BASOPHILS # BLD AUTO: 0.06 K/UL
BASOPHILS NFR BLD AUTO: 0.5 %
BILIRUB SERPL-MCNC: <0.2 MG/DL
BUN SERPL-MCNC: 21 MG/DL
CALCIUM SERPL-MCNC: 10.3 MG/DL
CHLORIDE SERPL-SCNC: 107 MMOL/L
CHOLEST SERPL-MCNC: 204 MG/DL
CO2 SERPL-SCNC: 21 MMOL/L
COVID-19 SPIKE DOMAIN ANTIBODY INTERPRETATION: POSITIVE
CREAT SERPL-MCNC: 0.75 MG/DL
CREAT SPEC-SCNC: 70 MG/DL
EOSINOPHIL # BLD AUTO: 0.19 K/UL
EOSINOPHIL NFR BLD AUTO: 1.7 %
GLUCOSE SERPL-MCNC: 91 MG/DL
HCT VFR BLD CALC: 42.9 %
HDLC SERPL-MCNC: 61 MG/DL
HGB BLD-MCNC: 13.2 G/DL
IMM GRANULOCYTES NFR BLD AUTO: 0.3 %
LDLC SERPL CALC-MCNC: 109 MG/DL
LYMPHOCYTES # BLD AUTO: 3.25 K/UL
LYMPHOCYTES NFR BLD AUTO: 28.9 %
MAN DIFF?: NORMAL
MCHC RBC-ENTMCNC: 26.9 PG
MCHC RBC-ENTMCNC: 30.8 GM/DL
MCV RBC AUTO: 87.6 FL
MICROALBUMIN 24H UR DL<=1MG/L-MCNC: <1.2 MG/DL
MICROALBUMIN/CREAT 24H UR-RTO: NORMAL MG/G
MONOCYTES # BLD AUTO: 0.84 K/UL
MONOCYTES NFR BLD AUTO: 7.5 %
NEUTROPHILS # BLD AUTO: 6.86 K/UL
NEUTROPHILS NFR BLD AUTO: 61.1 %
NONHDLC SERPL-MCNC: 144 MG/DL
PLATELET # BLD AUTO: 310 K/UL
POTASSIUM SERPL-SCNC: 4.6 MMOL/L
PROT SERPL-MCNC: 6.9 G/DL
RBC # BLD: 4.9 M/UL
RBC # FLD: 14.3 %
SARS-COV-2 AB SERPL IA-ACNC: >250 U/ML
SODIUM SERPL-SCNC: 141 MMOL/L
TRIGL SERPL-MCNC: 172 MG/DL
TSH SERPL-ACNC: 2.98 UIU/ML
VIT B12 SERPL-MCNC: 499 PG/ML
WBC # FLD AUTO: 11.23 K/UL

## 2021-03-27 NOTE — REVIEW OF SYSTEMS
[Negative] : Heme/Lymph [Fatigue] : no fatigue [Recent Weight Gain (___ Lbs)] : no recent weight gain [Recent Weight Loss (___ Lbs)] : recent weight loss: [unfilled] lbs [FreeTextEntry9] : knee pain

## 2021-03-27 NOTE — ASSESSMENT
[FreeTextEntry1] : 66 y.o. female with h/o Type 2 DM, HTN, hyperlipidemia and obesity.\par \par 1. Type 2 DM- Good control with Hba1c of 7% today. Encouraged carbohydrate consistent diet and exercise. Will continue Metformin 1,000 mg BID and Victoza 1.8 mg SQ daily. Will continue Jardiance 10 mg 1/2 tab daily. Advised about possible side effect of genital mycotic infections. Will check CMP and urine microalb/cr ratio. \par 2. HTN- BP is at goal and will continue ARB.  \par 3. Hyperlipidemia- Recommend starting statin which is also helpful for CV risk reduction. Patient declines statin at this time. Will check lipids. \par 4. Obesity- Encouraged diet changes and exercise. Agree with weight management consultation. Will continue GLP-1 analog and SGLT-2 inhibitor. Per orthopedics, knee replacement pending weight loss. \par 5. Vitamin D def- Will check 25 vitamin D level and will continue supplement.\par 6. Bone Health- DEXA scan performed in April 2018 is normal with spine 2.1 (arthritic changes), left femoral neck 0.4 and total hip 2.1, and 1.3 radius 0.0. Average risk of fracture. Encouraged weight bearing activity. Will monitor for now and repeat DEXA scan at next visit. \par \par Recommend baseline cardiology evaluation\par Follow up with ophthalmology\par Follow up in 3 to 4 months\par \par Counseling: The patient was counseled on carbohydrate consistent diet, diabetes foot care, long term vascular complications of diabetes, importance of diet and exercise to improve glycemic control, achieve weight loss and improve cardiovascular health. \par Risks and benefits of incretin mimetic therapy were discussed with the patient including nausea, pancreatitis and potential risk of medullary thyroid cancer. \par

## 2021-03-27 NOTE — HISTORY OF PRESENT ILLNESS
[FreeTextEntry1] : 66 y.o. female with h/o Type 2 DM, HTN and hyperlipidemia here for follow up visit. Reports COVID-19 infection in January 2021. Did receive monoclonal antibody infusion. Reports left breast infection in December 2019. Frustrated with weight but did lose weight since last visit. Snacking more. Had joined weight loss program at [place] and lost 22 pounds in 2018. Checks FS daily in the AM. Fasting blood glucose levels are 128 to 155. Taking Metformin 500 mg 2 BID and Victoza 1.8 mg SQ daily. Taking Jardiance 10 mg 1/2 tab daily (reduced secondary to frequent urination) Stopped glimepiride since May 8th 2017. Feeling good. No polyuria and no polydipsia. No SOB or CP. Needs ophthalmology follow up (last 7/17/19) and no retinopathy. Reports change in vision. Does get heel cracking of feet. Saw podiatry in the past, and diagnosed with tinea pedis. No proteinuria. No macrovascular complications. Has declined statin. C/o b/l knee pain so no walking. Received steroid injection in August 2019. Planning for right knee replacement; however delayed now. Planning to follow up with nutritionist at [facility]. No exercise. \par \par For breakfast: eggs with English muffin\par For lunch: boiled egg with cheese\par For dinner: fish, steak, strings beans or salad\par Snacks: fruits with cherries or peaches but does like sweets\par \par Regarding vitamin D def, takes vitamin D3 2,000 IU daily

## 2021-03-27 NOTE — QUALITY MEASURES
After Visit Summary   9/27/2017    Rosa Sotomayor    MRN: [id number]           Patient Information     Date Of Birth          1952        Visit Information        Provider Department      9/27/2017 9:30 AM Koki Meadows MD [facility]        Today's Diagnoses     Hyperlipidemia LDL goal <100    -  1    Pruritic disorder        Edema leg        B12 deficiency        Brittle nails        Brittle hair           Follow-ups after your visit        Additional Services     DERMATOLOGY REFERRAL       Your provider has referred you to: G: [facility] [place] [phone]   [email or web address]    Please be aware that coverage of these services is subject to the terms and limitations of your health insurance plan.  Call member services at your health plan with any benefit or coverage questions.      Please bring the following with you to your appointment:    (1) Any X-Rays, CTs or MRIs which have been performed.  Contact the facility where they were done to arrange for  prior to your scheduled appointment.    (2) List of current medications  (3) This referral request   (4) Any documents/labs given to you for this referral                  Your next 10 appointments already scheduled     Nov 20, 2017 10:30 AM CST   Office Visit with Josh Hollingsworth MD   [facility] [address] [address] [phone]           Bring a current list of meds and any records pertaining to this visit. For Physicals, please bring immunization records and any forms needing to be filled out. Please arrive 10 minutes early to complete paperwork.              Future tests that were ordered for you today     Open Future Orders        Priority Expected Expires Ordered    Lipid panel reflex to direct LDL Routine  9/27/2018 9/27/2017    Vitamin B12 Routine  9/27/2018 9/27/2017    Folate Routine   "9/27/2018 9/27/2017            Who to contact     If you have questions or need follow up information about today's clinic visit or your schedule please contact [facility] directly at [phone].  Normal or non-critical lab and imaging results will be communicated to you by MyChart, letter or phone within 4 business days after the clinic has received the results. If you do not hear from us within 7 days, please contact the clinic through MyChart or phone. If you have a critical or abnormal lab result, we will notify you by phone as soon as possible.  Submit refill requests through [employer] or call your pharmacy and they will forward the refill request to us. Please allow 3 business days for your refill to be completed.          Additional Information About Your Visit        [facility] Information     [employer] gives you secure access to your electronic health record. If you see a primary care provider, you can also send messages to your care team and make appointments. If you have questions, please call your primary care clinic.  If you do not have a primary care provider, please call [phone] and they will assist you.        Care EveryWhere ID     This is your Care EveryWhere ID. This could be used by other organizations to access your [place] medical records  [id number]        Your Vitals Were     Pulse Temperature Height Pulse Oximetry BMI (Body Mass Index)       86 98.7  F (37.1  C) (Oral) 5' 4\" (1.626 m) 97% 30.54 kg/m2        Blood Pressure from Last 3 Encounters:   09/27/17 106/63   08/10/17 124/71   07/05/17 112/60    Weight from Last 3 Encounters:   09/27/17 177 lb 14.4 oz (80.7 kg)   08/10/17 175 lb (79.4 kg)   06/14/17 177 lb (80.3 kg)              We Performed the Following     DERMATOLOGY REFERRAL          Where to get your medicines      These medications were sent to [id number] - [place], [place] - [address] AT [facility] [address] [place] [address]  [address] " [place] [address]    Hours:  24-hours Phone:  [phone]     hydrOXYzine 25 MG tablet    torsemide 10 MG tablet          Primary Care Provider Office Phone # Fax #    Josh Hollingsworth [phone]       [facility] [address]  [facility] [address]        Equal Access to Services     [facility]: Hadii aad ku hadasho Soomaali, waaxda luqadaha, qaybta kaalmada adeegyada, waxay idiin hayaan adeeg kharash la'aan ah. So [facility] [phone].    ATENCIÓN: Si habla español, tiene a armendariz disposición servicios gratuitos de asistencia lingüística. [facility] [phone].    We comply with applicable federal civil rights laws and [place] laws. We do not discriminate on the basis of race, color, national origin, age, disability sex, sexual orientation or gender identity.            Thank you!     Thank you for choosing [facility]  for your care. Our goal is always to provide you with excellent care. Hearing back from our patients is one way we can continue to improve our services. Please take a few minutes to complete the written survey that you may receive in the mail after your visit with us. Thank you!             Your Updated Medication List - Protect others around you: Learn how to safely use, store and throw away your medicines at www.disposemymeds.org.          This list is accurate as of: 9/27/17 10:31 AM.  Always use your most recent med list.                   Brand Name Dispense Instructions for use Diagnosis    amitriptyline 25 MG tablet    ELAVIL     Take 25 mg by mouth At Bedtime        ASPIRIN NOT PRESCRIBED    INTENTIONAL     Please choose reason not prescribed, below        atorvastatin 20 MG tablet    LIPITOR    90 tablet    TAKE 1 TABLET(20 MG) BY MOUTH DAILY    Hypertriglyceridemia       B-12 1000 MCG Tbcr     100 tablet    Take 1,000 mcg by mouth daily    Vitamin B12 deficiency (non anemic)       calcium acetate (Phos Binder) 667 MG Tabs     180 tablet     TAKE 1 TABLET BY MOUTH THREE TIMES DAILY    Secondary renal hyperparathyroidism (H)       carvedilol 6.25 MG tablet    COREG    270 tablet    12.5 mg in am and 6.25 mg in pm    Benign essential hypertension       cyclobenzaprine 5 MG tablet    FLEXERIL    15 tablet    Take 1 tablet (5 mg) by mouth 3 times daily as needed for muscle spasms        EPINEPHrine 0.3 MG/0.3ML injection 2-pack    EPIPEN 2-FRAN    0.6 mL    Inject 0.3 mLs (0.3 mg) into the muscle once as needed for anaphylaxis    Allergic reaction, subsequent encounter       estradiol 0.025 MG/24HR BIW patch    VIVELLE-DOT     Place 1 patch onto the skin twice a week        hydrOXYzine 25 MG tablet    ATARAX    120 tablet    Take 1 tablet (25 mg) by mouth 3 times daily as needed for itching    Pruritic disorder       ipratropium 0.06 % spray    ATROVENT    3 Box    Spray 2 sprays into both nostrils 4 times daily as needed for rhinitis    Chronic rhinitis, unspecified type       ketoconazole 2 % shampoo    NIZORAL    120 mL    Apply to the affected area and wash off after 5 minutes.    Seborrheic dermatitis       losartan 100 MG tablet    COZAAR    90 tablet    Take 1 tablet (100 mg) by mouth At Bedtime    Benign essential hypertension       magnesium 250 MG tablet      None Entered        PROMETRIUM 100 MG capsule   Generic drug:  progesterone      Take 100 mg by mouth daily        torsemide 10 MG tablet    DEMADEX    90 tablet    Take 1 tablet (10 mg) by mouth daily    Edema leg       vitamin D 2000 UNITS tablet      Take 1 tablet by mouth daily        ZOFRAN 4 MG tablet   Generic drug:  ondansetron      Take by mouth every 8 hours as needed for nausea        ZOMIG 5 MG Soln   Generic drug:  ZOLMitriptan              [Visual inspection, sensory exam] : Foot exam, including visual inspection, sensory exam with mono filament, and pulse exam, was performed within the last 12 months

## 2021-03-27 NOTE — PHYSICAL EXAM
[Alert] : alert [Obese] : obese [No Acute Distress] : no acute distress [Normal Sclera/Conjunctiva] : normal sclera/conjunctiva [EOMI] : extra ocular movement intact [Normal Oropharynx] : the oropharynx was normal [No LAD] : no lymphadenopathy [Thyroid Not Enlarged] : the thyroid was not enlarged [No Thyroid Nodules] : no palpable thyroid nodules [No Respiratory Distress] : no respiratory distress [No Accessory Muscle Use] : no accessory muscle use [Clear to Auscultation] : lungs were clear to auscultation bilaterally [Normal S1, S2] : normal S1 and S2 [Normal Rate] : heart rate was normal [Regular Rhythm] : with a regular rhythm [No Edema] : no peripheral edema [Pedal Pulses Normal] : the pedal pulses are present [Normal Bowel Sounds] : normal bowel sounds [Not Tender] : non-tender [Not Distended] : not distended [Soft] : abdomen soft [Normal Anterior Cervical Nodes] : no anterior cervical lymphadenopathy [No Spinal Tenderness] : no spinal tenderness [Spine Straight] : spine straight [No Stigmata of Cushings Syndrome] : no stigmata of Cushings Syndrome [No Clubbing, Cyanosis] : no clubbing  or cyanosis of the fingernails [No Rash] : no rash [Right Foot Was Examined] : right foot ~C was examined [Left Foot Was Examined] : left foot ~C was examined [Normal] : normal [2+] : 2+ in the dorsalis pedis [Normal Reflexes] : deep tendon reflexes were 2+ and symmetric [Normal Affect] : the affect was normal [Normal Mood] : the mood was normal [Kyphosis] : no kyphosis present [Acanthosis Nigricans] : no acanthosis nigricans [Diminished Throughout Both Feet] : normal tactile sensation with monofilament testing throughout both feet [FreeTextEntry1] : callus with tinea pedis [FreeTextEntry5] : callus with tinea pedis

## 2021-06-18 DIAGNOSIS — R92.2 INCONCLUSIVE MAMMOGRAM: ICD-10-CM

## 2021-06-22 ENCOUNTER — APPOINTMENT (OUTPATIENT)
Dept: BREAST CENTER | Facility: CLINIC | Age: 66
End: 2021-06-22
Payer: MEDICARE

## 2021-06-22 ENCOUNTER — NON-APPOINTMENT (OUTPATIENT)
Age: 66
End: 2021-06-22

## 2021-06-22 VITALS
OXYGEN SATURATION: 96 % | WEIGHT: 270 LBS | DIASTOLIC BLOOD PRESSURE: 68 MMHG | BODY MASS INDEX: 46.1 KG/M2 | SYSTOLIC BLOOD PRESSURE: 122 MMHG | HEART RATE: 69 BPM | HEIGHT: 64 IN

## 2021-06-22 DIAGNOSIS — Z85.3 PERSONAL HISTORY OF MALIGNANT NEOPLASM OF BREAST: ICD-10-CM

## 2021-06-22 PROCEDURE — 99213 OFFICE O/P EST LOW 20 MIN: CPT

## 2021-06-22 NOTE — ASSESSMENT
[FreeTextEntry1] : The patient is a 66-year-old G2, P2 postmenopausal white female.  She had her first child at age 32.  She never took any hormone replacement therapy after menopause.  She has no family history of breast or ovarian cancer.  The patient was initially diagnosed with a left breast cancer in 2005 and underwent a left breast lower outer quadrant partial mastectomy on March 22, 2007 for 3.5 mm moderately differentiated invasive duct cancer which was ER/AZ positive HER-2/benny negative.  [place] node performed at that time was negative.  This was a pathologic prognostic stage IA breast cancer.  I attempted a MammoSite balloon catheter but had to abort the procedure and she had hypofractionated radiation down at [facility] through Dr. Berkowitz.  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  She did not receive any further radiation.  She did undergo comprehensive BRCA testing in November 2011 which was negative.  She did not receive any further radiation and was put on tamoxifen by Dr. Hugh Patel.  She then underwent a left breast stereotactic core biopsy for calcifications on June 27, 2018 showing fat necrosis and stromal fibrosis.  She developed some left breast erythema as well as fever and chills and was seen by Dr. Johnson the December 2019 and placed on antibiotics and also had a punch biopsy of the skin which showed chronic dermal inflammation.  The redness resolved spontaneously.  On exam today, the patient has no evidence of recurrence in the left breast and no suspicious findings in the right.  She underwent her last bilateral mammography and ultrasound on June 17, 2021 in [place] which showed no suspicious findings.  She should follow-up again in 1 year and her next bilateral mammography and ultrasound will be due at that time in June 2022.  I did talk to her about stopping tamoxifen now that she is 10 years out from her recurrence.

## 2021-06-22 NOTE — PHYSICAL EXAM
[Normocephalic] : normocephalic [Atraumatic] : atraumatic [EOMI] : extra ocular movement intact [Supple] : supple [No Supraclavicular Adenopathy] : no supraclavicular adenopathy [No Cervical Adenopathy] : no cervical adenopathy [Examined in the supine and seated position] : examined in the supine and seated position [No dominant masses] : no dominant masses in right breast  [No dominant masses] : no dominant masses left breast [No Nipple Retraction] : no right nipple retraction [No Nipple Discharge] : no left nipple discharge [Breast Mass Right Breast ___cm] : no masses [Breast Nipple Inversion] : nipples not inverted [Breast Mass Left Breast ___cm] : no masses [Breast Nipple Retraction] : nipples not retracted [Breast Nipple Flattening] : nipples not flattened [Breast Nipple Fissures] : nipples not fissured [Breast Abnormal Lactation (Galactorrhea)] : no galactorrhea [Breast Abnormal Secretion Bloody Fluid] : no bloody discharge [Breast Abnormal Secretion Serous Fluid] : no serous discharge [Breast Abnormal Secretion Opalescent Fluid] : no milky discharge [No Axillary Lymphadenopathy] : no left axillary lymphadenopathy [No Edema] : no edema [No Rashes] : no rashes [No Ulceration] : no ulceration [de-identified] : On exam, the patient has a large ptotic D-cup breast on the right and a much smaller B cup breast on the left secondary to the 2 wide excisions and prior radiation therapy.  On palpation, she has typical radiation and postop changes to the left breast but no areas suspicious for recurrence.  She has no axillary, supraclavicular, or cervical adenopathy. [de-identified] : Status post 2 partial mastectomies and prior radiation therapy with significantly smaller breast but no evidence of recurrence

## 2021-06-22 NOTE — HISTORY OF PRESENT ILLNESS
[FreeTextEntry1] : The patient is a 66-year-old G2, P2 postmenopausal white female.  She had her first child at age 32.  She never took any hormone replacement therapy after menopause.  She has no family history of breast or ovarian cancer.  The patient was initially diagnosed with a left breast cancer in 2005 and underwent a left breast lower outer quadrant partial mastectomy on March 22, 2007 for 3.5 mm moderately differentiated invasive duct cancer which was ER/HI positive HER-2/benny negative.  [place] node performed at that time was negative.  This was a pathologic prognostic stage IA breast cancer.  I attempted a MammoSite balloon catheter but had to abort the procedure and she had hypofractionated radiation down at [facility] through Dr. Berkowitz.  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  She did not receive any further radiation.  She did undergo comprehensive BRCA testing in November 2011 which was negative.  She did not receive any further radiation and was put on tamoxifen by Dr. Hugh Patel.  She then underwent a left breast stereotactic core biopsy for calcifications on June 27, 2018 showing fat necrosis and stromal fibrosis.  She developed some left breast erythema as well as fever and chills and was seen by Dr. Johnson the December 2019 and placed on antibiotics and also had a punch biopsy of the skin which showed chronic dermal inflammation.  The redness resolved spontaneously.  She comes in for routine follow-up and continues to get yearly mammography and ultrasound in [place].

## 2021-06-22 NOTE — REASON FOR VISIT
[Follow-Up: _____] : a [unfilled] follow-up visit [FreeTextEntry1] : The patient comes in with a personal history of a left breast cancer diagnosed in 2007 for which she underwent a partial mastectomy in March 2007 for 3.5 mm moderately differentiated invasive duct cancer which was ER/RI positive HER-2/benny negative with 1 negative sentinel lymph node making this a pathologic prognostic stage IA breast cancer.  She received hypofractionated radiation down at [facility].  She developed a recurrence in 2011 and underwent a repeat wide excision without radiation in December 2011 for low nuclear grade DCIS which was ER/RI positive.  She was placed on tamoxifen.  Stereotactic left breast core biopsy for calcifications in June 2018 just showed fat necrosis and stromal fibrosis.  She comes in for routine yearly follow-up and gets yearly mammography and ultrasound in [place].

## 2021-07-14 ENCOUNTER — APPOINTMENT (OUTPATIENT)
Dept: ENDOCRINOLOGY | Facility: CLINIC | Age: 66
End: 2021-07-14
Payer: MEDICARE

## 2021-07-14 ENCOUNTER — RESULT CHARGE (OUTPATIENT)
Age: 66
End: 2021-07-14

## 2021-07-14 VITALS
BODY MASS INDEX: 50.05 KG/M2 | DIASTOLIC BLOOD PRESSURE: 76 MMHG | TEMPERATURE: 98 F | OXYGEN SATURATION: 97 % | HEART RATE: 80 BPM | WEIGHT: 272 LBS | SYSTOLIC BLOOD PRESSURE: 122 MMHG | HEIGHT: 61.9 IN

## 2021-07-14 VITALS — HEIGHT: 61.9 IN | BODY MASS INDEX: 50.05 KG/M2 | WEIGHT: 272 LBS | TEMPERATURE: 98 F

## 2021-07-14 VITALS — DIASTOLIC BLOOD PRESSURE: 76 MMHG | OXYGEN SATURATION: 97 % | HEART RATE: 80 BPM | SYSTOLIC BLOOD PRESSURE: 122 MMHG

## 2021-07-14 LAB
GLUCOSE BLDC GLUCOMTR-MCNC: 81
HBA1C MFR BLD HPLC: 6.9

## 2021-07-14 PROCEDURE — 77080 DXA BONE DENSITY AXIAL: CPT | Mod: GA

## 2021-07-14 PROCEDURE — 83036 HEMOGLOBIN GLYCOSYLATED A1C: CPT | Mod: QW

## 2021-07-14 PROCEDURE — ZZZZZ: CPT

## 2021-07-14 PROCEDURE — 99215 OFFICE O/P EST HI 40 MIN: CPT | Mod: 25

## 2021-07-14 RX ORDER — TAMOXIFEN CITRATE 20 MG/1
20 TABLET, FILM COATED ORAL DAILY
Qty: 90 | Refills: 2 | Status: DISCONTINUED | COMMUNITY
Start: 2021-05-03 | End: 2021-07-14

## 2021-07-14 NOTE — ASSESSMENT
[FreeTextEntry1] : 66 y.o. female with h/o Type 2 DM, HTN, hyperlipidemia, obesity and vitamin D def.\par \par 1. Type 2 DM- Good control with Hba1c of 6.9% today. Encouraged carbohydrate consistent diet and exercise. Will continue Metformin 1,000 mg BID and Victoza 1.8 mg SQ daily. Will consider switching to weekly GLP-1 so we can titrate at higher doses when she returns from [facility]. Will continue Jardiance 10 mg 1/2 tab daily. Advised about possible side effect of genital mycotic infections. Will check CMP and urine microalb/cr ratio. \par \par 2. HTN- BP is at goal and will continue ARB.  \par \par 3. Hyperlipidemia- Recommend starting statin which is also helpful for CV risk reduction. Patient declines statin at this time. Will check lipids. \par \par 4. Obesity- Encouraged diet changes and exercise. Agree with weight management consultation. Will continue GLP-1 analog (will switch to weekly agent and titrate up the dose) and SGLT-2 inhibitor. Per orthopedics, knee replacement pending weight loss. \par \par 5. Vitamin D def- Will check 25 vitamin D level and will continue supplement.\par \par 6. Bone Health- DEXA scan performed today is normal with spine 2.0 (arthritic changes), left femoral neck 1.0 and total hip 2.4, and 1.3 radius 0.5. Average risk of fracture. Encouraged weight bearing activity. Will monitor for now and repeat DEXA scan in 2 to 4 years. . \par \par Recommend baseline cardiology evaluation\par Follow up in 3 to 4 months\par \par Counseling: The patient was counseled on carbohydrate consistent diet, diabetes foot care, long term vascular complications of diabetes, importance of diet and exercise to improve glycemic control, achieve weight loss and improve cardiovascular health. \par Risks and benefits of incretin mimetic therapy were discussed with the patient including nausea, pancreatitis and potential risk of medullary thyroid cancer. \par

## 2021-07-14 NOTE — REVIEW OF SYSTEMS
[Negative] : Heme/Lymph [Fatigue] : no fatigue [Recent Weight Gain (___ Lbs)] : no recent weight gain [Recent Weight Loss (___ Lbs)] : no recent weight loss [FreeTextEntry9] : knee pain

## 2021-07-14 NOTE — PHYSICAL EXAM
[Alert] : alert [Obese] : obese [No Acute Distress] : no acute distress [Normal Sclera/Conjunctiva] : normal sclera/conjunctiva [EOMI] : extra ocular movement intact [Normal Oropharynx] : the oropharynx was normal [No LAD] : no lymphadenopathy [Thyroid Not Enlarged] : the thyroid was not enlarged [No Thyroid Nodules] : no palpable thyroid nodules [No Respiratory Distress] : no respiratory distress [No Accessory Muscle Use] : no accessory muscle use [Clear to Auscultation] : lungs were clear to auscultation bilaterally [Normal S1, S2] : normal S1 and S2 [Normal Rate] : heart rate was normal [Regular Rhythm] : with a regular rhythm [No Edema] : no peripheral edema [Pedal Pulses Normal] : the pedal pulses are present [Normal Bowel Sounds] : normal bowel sounds [Not Tender] : non-tender [Not Distended] : not distended [Soft] : abdomen soft [Normal Anterior Cervical Nodes] : no anterior cervical lymphadenopathy [No Spinal Tenderness] : no spinal tenderness [No Stigmata of Cushings Syndrome] : no stigmata of Cushings Syndrome [No Clubbing, Cyanosis] : no clubbing  or cyanosis of the fingernails [No Rash] : no rash [Right Foot Was Examined] : right foot ~C was examined [Left Foot Was Examined] : left foot ~C was examined [Normal] : normal [2+] : 2+ in the dorsalis pedis [Normal Reflexes] : deep tendon reflexes were 2+ and symmetric [Normal Affect] : the affect was normal [Normal Mood] : the mood was normal [Kyphosis] : no kyphosis present [Acanthosis Nigricans] : no acanthosis nigricans [Diminished Throughout Both Feet] : normal tactile sensation with monofilament testing throughout both feet [de-identified] : varicose veins b/l [FreeTextEntry1] : callus with tinea pedis [FreeTextEntry5] : callus with tinea pedis

## 2021-07-14 NOTE — HISTORY OF PRESENT ILLNESS
[FreeTextEntry1] : 66 y.o. female with h/o Type 2 DM, HTN and hyperlipidemia here for follow up visit. Reports COVID-19 infection in January 2021. Did receive monoclonal antibody infusion. Now s/p COVID-19 Pfizer vaccine X 2. Reports left breast infection in December 2019. Frustrated with weight but weight is stable since last visit. Snacking more. Had joined weight loss program at [place] and lost 22 pounds in 2018. Checks FS daily in the AM. Fasting blood glucose levels are 130 to 163. Taking Metformin 500 mg 2 BID and Victoza 1.8 mg SQ daily. Taking Jardiance 10 mg 1/2 tab daily (reduced secondary to frequent urination) Stopped glimepiride since May 8th 2017. Feeling good. No polyuria and no polydipsia. No SOB or CP. UTD with ophthalmology follow up (6/30/21) and no retinopathy. Reports change in vision. Does get heel cracking of feet. Saw podiatry in the past, and diagnosed with tinea pedis. No proteinuria. No macrovascular complications. Has declined statin. C/o b/l knee pain so no walking. Received steroid injection in August 2019. Planning for right knee replacement; however delayed now. No exercise. \par \par For breakfast: eggs with English muffin\par For lunch: boiled egg with cheese\par For dinner: fish, steak, strings beans or salad and sometimes with potato, stuffed vegetables with rice\par Snacks: fruits with cherries or peaches but does like sweets\par \par Regarding vitamin D def, takes vitamin D3 2,000 IU once weekly and MVI daily.\par \par Bone Health- DEXA scan performed in April 2018 is normal with spine 2.1 (arthritic changes), left femoral neck 0.4 and total hip 2.1, and 1.3 radius 0.0.

## 2021-07-15 LAB
25(OH)D3 SERPL-MCNC: 63.2 NG/ML
ALBUMIN SERPL ELPH-MCNC: 4.3 G/DL
ALP BLD-CCNC: 57 U/L
ALT SERPL-CCNC: 40 U/L
ANION GAP SERPL CALC-SCNC: 16 MMOL/L
AST SERPL-CCNC: 35 U/L
BASOPHILS # BLD AUTO: 0.05 K/UL
BASOPHILS NFR BLD AUTO: 0.5 %
BILIRUB SERPL-MCNC: 0.2 MG/DL
BUN SERPL-MCNC: 22 MG/DL
CALCIUM SERPL-MCNC: 10 MG/DL
CHLORIDE SERPL-SCNC: 105 MMOL/L
CHOLEST SERPL-MCNC: 199 MG/DL
CO2 SERPL-SCNC: 19 MMOL/L
CREAT SERPL-MCNC: 0.8 MG/DL
CREAT SPEC-SCNC: 68 MG/DL
EOSINOPHIL # BLD AUTO: 0.2 K/UL
EOSINOPHIL NFR BLD AUTO: 1.8 %
FOLATE SERPL-MCNC: >20 NG/ML
GLUCOSE SERPL-MCNC: 65 MG/DL
HCT VFR BLD CALC: 43.6 %
HDLC SERPL-MCNC: 59 MG/DL
HGB BLD-MCNC: 13.7 G/DL
IMM GRANULOCYTES NFR BLD AUTO: 0.3 %
LDLC SERPL CALC-MCNC: 109 MG/DL
LYMPHOCYTES # BLD AUTO: 3.55 K/UL
LYMPHOCYTES NFR BLD AUTO: 32.4 %
MAGNESIUM SERPL-MCNC: 1.9 MG/DL
MAN DIFF?: NORMAL
MCHC RBC-ENTMCNC: 28 PG
MCHC RBC-ENTMCNC: 31.4 GM/DL
MCV RBC AUTO: 89 FL
MICROALBUMIN 24H UR DL<=1MG/L-MCNC: <1.2 MG/DL
MICROALBUMIN/CREAT 24H UR-RTO: NORMAL MG/G
MONOCYTES # BLD AUTO: 0.9 K/UL
MONOCYTES NFR BLD AUTO: 8.2 %
NEUTROPHILS # BLD AUTO: 6.21 K/UL
NEUTROPHILS NFR BLD AUTO: 56.8 %
NONHDLC SERPL-MCNC: 140 MG/DL
PLATELET # BLD AUTO: 264 K/UL
POTASSIUM SERPL-SCNC: 4.9 MMOL/L
PROT SERPL-MCNC: 7 G/DL
RBC # BLD: 4.9 M/UL
RBC # FLD: 14.2 %
SODIUM SERPL-SCNC: 140 MMOL/L
TRIGL SERPL-MCNC: 156 MG/DL
TSH SERPL-ACNC: 2.5 UIU/ML
VIT B12 SERPL-MCNC: 466 PG/ML
WBC # FLD AUTO: 10.94 K/UL

## 2021-09-08 ENCOUNTER — RX RENEWAL (OUTPATIENT)
Age: 66
End: 2021-09-08

## 2021-10-31 ENCOUNTER — RX RENEWAL (OUTPATIENT)
Age: 66
End: 2021-10-31

## 2021-12-01 ENCOUNTER — APPOINTMENT (OUTPATIENT)
Dept: ENDOCRINOLOGY | Facility: CLINIC | Age: 66
End: 2021-12-01
Payer: MEDICARE

## 2021-12-01 VITALS
OXYGEN SATURATION: 97 % | HEIGHT: 61.9 IN | DIASTOLIC BLOOD PRESSURE: 60 MMHG | TEMPERATURE: 98.3 F | HEART RATE: 95 BPM | SYSTOLIC BLOOD PRESSURE: 122 MMHG | BODY MASS INDEX: 50.97 KG/M2 | WEIGHT: 277 LBS

## 2021-12-01 LAB — GLUCOSE BLDC GLUCOMTR-MCNC: 89

## 2021-12-01 PROCEDURE — 99215 OFFICE O/P EST HI 40 MIN: CPT | Mod: 25

## 2021-12-01 PROCEDURE — 90662 IIV NO PRSV INCREASED AG IM: CPT

## 2021-12-01 PROCEDURE — 83036 HEMOGLOBIN GLYCOSYLATED A1C: CPT | Mod: QW

## 2021-12-01 PROCEDURE — G0008: CPT

## 2021-12-01 PROCEDURE — 82962 GLUCOSE BLOOD TEST: CPT

## 2021-12-02 LAB — HBA1C MFR BLD HPLC: 6.9

## 2021-12-02 RX ORDER — TERBINAFINE HYDROCHLORIDE 250 MG/1
250 TABLET ORAL
Qty: 90 | Refills: 0 | Status: DISCONTINUED | COMMUNITY
Start: 2021-08-19

## 2021-12-02 RX ORDER — LIDOCAINE 5 G/100G
5 OINTMENT TOPICAL
Qty: 150 | Refills: 0 | Status: DISCONTINUED | COMMUNITY
Start: 2021-05-25

## 2021-12-02 NOTE — PHYSICAL EXAM
[Alert] : alert [Obese] : obese [No Acute Distress] : no acute distress [Normal Sclera/Conjunctiva] : normal sclera/conjunctiva [EOMI] : extra ocular movement intact [Normal Oropharynx] : the oropharynx was normal [No LAD] : no lymphadenopathy [Thyroid Not Enlarged] : the thyroid was not enlarged [No Thyroid Nodules] : no palpable thyroid nodules [No Respiratory Distress] : no respiratory distress [No Accessory Muscle Use] : no accessory muscle use [Clear to Auscultation] : lungs were clear to auscultation bilaterally [Normal S1, S2] : normal S1 and S2 [Normal Rate] : heart rate was normal [Regular Rhythm] : with a regular rhythm [No Edema] : no peripheral edema [Pedal Pulses Normal] : the pedal pulses are present [Normal Bowel Sounds] : normal bowel sounds [Not Tender] : non-tender [Not Distended] : not distended [Soft] : abdomen soft [Normal Anterior Cervical Nodes] : no anterior cervical lymphadenopathy [No Spinal Tenderness] : no spinal tenderness [No Stigmata of Cushings Syndrome] : no stigmata of Cushings Syndrome [No Clubbing, Cyanosis] : no clubbing  or cyanosis of the fingernails [No Rash] : no rash [Normal] : normal [2+] : 2+ in the dorsalis pedis [Normal Reflexes] : deep tendon reflexes were 2+ and symmetric [Normal Affect] : the affect was normal [Normal Mood] : the mood was normal [Kyphosis] : no kyphosis present [Acanthosis Nigricans] : no acanthosis nigricans [Right foot was examined, including] : right foot ~C was examined, including visual inspection with sensory and pulse exams [Left foot was examined, including] : left foot ~C was examined, including visual inspection with sensory and pulse exams [Diminished Throughout Both Feet] : normal tactile sensation with monofilament testing throughout both feet [de-identified] : varicose veins b/l [FreeTextEntry1] : callus with tinea pedis [FreeTextEntry5] : callus with tinea pedis

## 2021-12-02 NOTE — ASSESSMENT
[FreeTextEntry1] : 66 y.o. female with h/o Type 2 DM, HTN, hyperlipidemia, obesity and vitamin D def.\par \par 1. Type 2 DM- Good control with Hba1c of 6.9% today. Encouraged carbohydrate consistent diet and exercise. Will continue Metformin 1,000 mg BID and Victoza 1.8 mg SQ daily. Discussed switching to weekly GLP-1 so we can titrate at higher doses which may assist more with weight loss. For now, she prefers to continue current regimen. Will continue Jardiance 10 mg 1/2 tab daily. Advised about possible side effect of genital mycotic infections. Will check CMP and urine microalb/cr ratio. \par \par 2. HTN- BP is at goal and will continue ARB.  \par \par 3. Hyperlipidemia- Recommend starting statin which is also helpful for CV risk reduction. Patient declines statin at this time. Will check lipids. \par \par 4. Obesity- Encouraged diet changes and exercise. Agree with weight management consultation. Will continue GLP-1 analog (discussed switching to weekly agent and titrate up the dose) and SGLT-2 inhibitor. Per orthopedics, knee replacement pending weight loss. \par \par 5. Vitamin D def- Will check 25 vitamin D level and will continue supplement.\par \par 6. Bone Health- DEXA scan performed in July 2021 is normal with spine 2.0 (arthritic changes), left femoral neck 1.0 and total hip 2.4, and 1.3 radius 0.5. Average risk of fracture. Encouraged weight bearing activity. Will monitor for now and repeat DEXA scan in 2 to 4 years. . \par \par Recommend baseline cardiology evaluation\par Follow up in 3 to 4 months\par \par Counseling: The patient was counseled on carbohydrate consistent diet, diabetes foot care, long term vascular complications of diabetes, importance of diet and exercise to improve glycemic control, achieve weight loss and improve cardiovascular health. \par Risks and benefits of incretin mimetic therapy were discussed with the patient including nausea, pancreatitis and potential risk of medullary thyroid cancer. \par

## 2021-12-02 NOTE — HISTORY OF PRESENT ILLNESS
[FreeTextEntry1] : 66 y.o. female with h/o Type 2 DM, HTN and hyperlipidemia here for follow up visit. Reports COVID-19 infection in January 2021. Did receive monoclonal antibody infusion. Now s/p COVID-19 Pfizer vaccine X 2 plus booster. Reports left breast infection in December 2019. Frustrated with weight but weight is stable since last visit. Had joined weight loss program at [place] and lost 22 pounds in 2018. Checks FS daily in the AM. Fasting blood glucose levels are 109 to 138. Taking Metformin 500 mg 2 BID and Victoza 1.8 mg SQ daily. Taking Jardiance 10 mg 1/2 tab daily (reduced secondary to frequent urination). Stopped glimepiride since May 8th 2017. Feeling good. No polyuria and no polydipsia. No SOB or CP. UTD with ophthalmology (6/30/21) and no retinopathy. Reports change in vision. Does get heel cracking of feet. Saw podiatry in the past, and diagnosed with tinea pedis. No proteinuria. No macrovascular complications. Has declined statin. C/o b/l knee pain but did increase in walking while on vacation. Received steroid injection in October 2021. Planning for right knee replacement; however delayed now. No exercise. \par \par For breakfast: eggs with English muffin\par For lunch: boiled egg with cheese\par For dinner: fish, steak, strings beans or salad and sometimes with potato, stuffed vegetables with rice\par Snacks: fruits with cherries or peaches but does like sweets\par \par Regarding vitamin D def, takes vitamin D3 2,000 IU once weekly and MVI daily.\par \par Bone Health- DEXA scan performed in April 2018 is normal with spine 2.1 (arthritic changes), left femoral neck 0.4 and total hip 2.1, and 1.3 radius 0.0. DEXA scan performed in July 2021 is normal with spine 2.0 (arthritic changes), left femoral neck 1.0 and total hip 2.4, and 1.3 radius 0.5.

## 2021-12-03 LAB
25(OH)D3 SERPL-MCNC: 52.7 NG/ML
ALBUMIN SERPL ELPH-MCNC: 4.6 G/DL
ALP BLD-CCNC: 84 U/L
ALT SERPL-CCNC: 35 U/L
ANION GAP SERPL CALC-SCNC: 17 MMOL/L
AST SERPL-CCNC: 25 U/L
BASOPHILS # BLD AUTO: 0.06 K/UL
BASOPHILS NFR BLD AUTO: 0.7 %
BILIRUB SERPL-MCNC: 0.2 MG/DL
BUN SERPL-MCNC: 24 MG/DL
CALCIUM SERPL-MCNC: 10.5 MG/DL
CHLORIDE SERPL-SCNC: 104 MMOL/L
CHOLEST SERPL-MCNC: 235 MG/DL
CO2 SERPL-SCNC: 21 MMOL/L
CREAT SERPL-MCNC: 0.87 MG/DL
CREAT SPEC-SCNC: 64 MG/DL
EOSINOPHIL # BLD AUTO: 0.13 K/UL
EOSINOPHIL NFR BLD AUTO: 1.5 %
FOLATE SERPL-MCNC: 11.3 NG/ML
GLUCOSE SERPL-MCNC: 105 MG/DL
HCT VFR BLD CALC: 46.4 %
HDLC SERPL-MCNC: 66 MG/DL
HGB BLD-MCNC: 14.4 G/DL
IMM GRANULOCYTES NFR BLD AUTO: 0.4 %
LDLC SERPL CALC-MCNC: 145 MG/DL
LYMPHOCYTES # BLD AUTO: 1.36 K/UL
LYMPHOCYTES NFR BLD AUTO: 16.1 %
MAN DIFF?: NORMAL
MCHC RBC-ENTMCNC: 27.6 PG
MCHC RBC-ENTMCNC: 31 GM/DL
MCV RBC AUTO: 89.1 FL
MICROALBUMIN 24H UR DL<=1MG/L-MCNC: <1.2 MG/DL
MICROALBUMIN/CREAT 24H UR-RTO: NORMAL MG/G
MONOCYTES # BLD AUTO: 0.69 K/UL
MONOCYTES NFR BLD AUTO: 8.2 %
NEUTROPHILS # BLD AUTO: 6.16 K/UL
NEUTROPHILS NFR BLD AUTO: 73.1 %
NONHDLC SERPL-MCNC: 169 MG/DL
PLATELET # BLD AUTO: 298 K/UL
POTASSIUM SERPL-SCNC: 5 MMOL/L
PROT SERPL-MCNC: 7.4 G/DL
RBC # BLD: 5.21 M/UL
RBC # FLD: 14.6 %
SODIUM SERPL-SCNC: 141 MMOL/L
TRIGL SERPL-MCNC: 120 MG/DL
TSH SERPL-ACNC: 1.73 UIU/ML
VIT B12 SERPL-MCNC: 469 PG/ML
WBC # FLD AUTO: 8.43 K/UL

## 2022-04-08 ENCOUNTER — APPOINTMENT (OUTPATIENT)
Dept: ENDOCRINOLOGY | Facility: CLINIC | Age: 67
End: 2022-04-08
Payer: MEDICARE

## 2022-04-08 VITALS
DIASTOLIC BLOOD PRESSURE: 70 MMHG | HEIGHT: 61.9 IN | WEIGHT: 273 LBS | OXYGEN SATURATION: 97 % | BODY MASS INDEX: 50.24 KG/M2 | TEMPERATURE: 97.9 F | SYSTOLIC BLOOD PRESSURE: 120 MMHG | HEART RATE: 79 BPM

## 2022-04-08 LAB
GLUCOSE BLDC GLUCOMTR-MCNC: 131
HBA1C MFR BLD HPLC: 6.8

## 2022-04-08 PROCEDURE — 83036 HEMOGLOBIN GLYCOSYLATED A1C: CPT | Mod: QW

## 2022-04-08 PROCEDURE — 99214 OFFICE O/P EST MOD 30 MIN: CPT | Mod: 25

## 2022-04-08 PROCEDURE — 82962 GLUCOSE BLOOD TEST: CPT

## 2022-04-10 NOTE — HISTORY OF PRESENT ILLNESS
[FreeTextEntry1] : 67 y.o. female with h/o Type 2 DM, HTN and hyperlipidemia here for follow up visit. Reports COVID-19 infection in January 2021. Did receive monoclonal antibody infusion. Now s/p COVID-19 Pfizer vaccine X 2 plus booster. Reports left breast infection in December 2019. Frustrated with weight but weight is stable since last visit. Had joined weight loss program at [place] and lost 22 pounds in 2018. Checks FS daily in the AM. Fasting blood glucose levels are 116 to 149. Taking Metformin 500 mg 2 BID and Victoza 1.8 mg SQ daily. Taking Jardiance 10 mg 1/2 tab daily (reduced secondary to frequent urination). Stopped glimepiride since May 8th 2017. Feeling good. No polyuria and no polydipsia. No SOB or CP. UTD with ophthalmology (6/30/21) and no retinopathy. Reports change in vision. Does get heel cracking of feet. Saw podiatry in the past, and diagnosed with tinea pedis. No proteinuria. No macrovascular complications. Has declined statin. C/o b/l knee pain but does some walking. Received steroid injection in October 2021 and in March 2022. Tried gel shot but not helpful. Planning for right knee replacement; however delayed now. No exercise. \par \par Diet:\par For breakfast: eggs with English muffin\par For lunch: boiled egg with cheese\par For dinner: fish, strings beans or salad and sometimes with potato, stuffed vegetables with rice\par Snacks: fruits with banana or grapefruit but does like sweets\par \par Regarding vitamin D def, takes  MVI daily.\par \par Bone Health- DEXA scan performed in April 2018 is normal with spine 2.1 (arthritic changes), left femoral neck 0.4 and total hip 2.1, and 1.3 radius 0.0. DEXA scan performed in July 2021 is normal with spine 2.0 (arthritic changes), left femoral neck 1.0 and total hip 2.4, and 1.3 radius 0.5.

## 2022-04-10 NOTE — PHYSICAL EXAM
[Alert] : alert [Obese] : obese [No Acute Distress] : no acute distress [Normal Sclera/Conjunctiva] : normal sclera/conjunctiva [EOMI] : extra ocular movement intact [Normal Oropharynx] : the oropharynx was normal [No LAD] : no lymphadenopathy [Thyroid Not Enlarged] : the thyroid was not enlarged [No Thyroid Nodules] : no palpable thyroid nodules [No Respiratory Distress] : no respiratory distress [No Accessory Muscle Use] : no accessory muscle use [Clear to Auscultation] : lungs were clear to auscultation bilaterally [Normal S1, S2] : normal S1 and S2 [Normal Rate] : heart rate was normal [Regular Rhythm] : with a regular rhythm [No Edema] : no peripheral edema [Pedal Pulses Normal] : the pedal pulses are present [Normal Bowel Sounds] : normal bowel sounds [Not Tender] : non-tender [Not Distended] : not distended [Soft] : abdomen soft [Normal Anterior Cervical Nodes] : no anterior cervical lymphadenopathy [No Spinal Tenderness] : no spinal tenderness [No Stigmata of Cushings Syndrome] : no stigmata of Cushings Syndrome [No Clubbing, Cyanosis] : no clubbing  or cyanosis of the fingernails [No Rash] : no rash [Right foot was examined, including] : right foot ~C was examined, including visual inspection with sensory and pulse exams [Left foot was examined, including] : left foot ~C was examined, including visual inspection with sensory and pulse exams [Normal] : normal [2+] : 2+ in the dorsalis pedis [Normal Reflexes] : deep tendon reflexes were 2+ and symmetric [Normal Affect] : the affect was normal [Normal Mood] : the mood was normal [Kyphosis] : no kyphosis present [Acanthosis Nigricans] : no acanthosis nigricans [Diminished Throughout Both Feet] : normal tactile sensation with monofilament testing throughout both feet [de-identified] : varicose veins b/l [FreeTextEntry1] : callus with tinea pedis [FreeTextEntry2] : hammer toe [FreeTextEntry5] : callus with tinea pedis

## 2022-04-10 NOTE — REVIEW OF SYSTEMS
[Negative] : Heme/Lymph [Fatigue] : no fatigue [Recent Weight Gain (___ Lbs)] : no recent weight gain [Recent Weight Loss (___ Lbs)] : no recent weight loss [FreeTextEntry3] : change in reading vision [FreeTextEntry9] : knee pain

## 2022-04-10 NOTE — ASSESSMENT
[FreeTextEntry1] : 67 y.o. female with h/o Type 2 DM, HTN, hyperlipidemia, obesity and vitamin D def.\par \par 1. Type 2 DM- Good control with Hba1c of 6.8% today. Encouraged a carbohydrate consistent diet and exercise. Will continue Metformin 1,000 mg BID and Victoza 1.8 mg SQ daily. Discussed switching to weekly GLP-1 agonist so we can titrate at higher doses which may assist more with weight loss. For now, she prefers to continue current regimen. Will continue Jardiance 10 mg 1/2 tab daily. Advised about possible side effect of genital mycotic infections. Will check CMP and urine alb/cr ratio. \par \par 2. HTN- BP is at goal and will continue ARB.  \par \par 3. Hyperlipidemia- Recommend starting statin which is also helpful for CV risk reduction. Patient declines statin at this time. Will check lipids. \par \par 4. Obesity- Encouraged diet changes and exercise. Will continue GLP-1 agonist (discussed switching to weekly agent and titrate up the dose) and SGLT-2 inhibitor. Per orthopedics, knee replacement pending weight loss. \par \par 5. Vitamin D def- Will check 25 vitamin D level and will continue supplement.\par \par 6. Bone Health- DEXA scan performed in July 2021 is normal with spine 2.0 (arthritic changes), left femoral neck 1.0 and total hip 2.4, and 1.3 radius 0.5. Average risk of fracture. Encouraged weight bearing activity. Will monitor for now and repeat DEXA scan in 2 to 4 years. . \par \par Recommend baseline cardiology evaluation\par Follow up in 3 to 4 months\par \par Counseling: The patient was counseled on carbohydrate consistent diet, diabetes foot care, long term vascular complications of diabetes, importance of diet and exercise to improve glycemic control, achieve weight loss and improve cardiovascular health. \par Risks and benefits of incretin mimetic therapy were discussed with the patient including nausea, pancreatitis and potential risk of medullary thyroid cancer. \par

## 2022-04-11 LAB
25(OH)D3 SERPL-MCNC: 48.2 NG/ML
ALBUMIN SERPL ELPH-MCNC: 4.4 G/DL
ALP BLD-CCNC: 87 U/L
ALT SERPL-CCNC: 28 U/L
ANION GAP SERPL CALC-SCNC: 12 MMOL/L
AST SERPL-CCNC: 20 U/L
BASOPHILS # BLD AUTO: 0.06 K/UL
BASOPHILS NFR BLD AUTO: 0.7 %
BILIRUB SERPL-MCNC: 0.2 MG/DL
BUN SERPL-MCNC: 16 MG/DL
CALCIUM SERPL-MCNC: 9.9 MG/DL
CHLORIDE SERPL-SCNC: 107 MMOL/L
CHOLEST SERPL-MCNC: 219 MG/DL
CO2 SERPL-SCNC: 22 MMOL/L
CREAT SERPL-MCNC: 0.83 MG/DL
CREAT SPEC-SCNC: 108 MG/DL
EGFR: 77 ML/MIN/1.73M2
EOSINOPHIL # BLD AUTO: 0.18 K/UL
EOSINOPHIL NFR BLD AUTO: 2 %
FOLATE SERPL-MCNC: >20 NG/ML
GLUCOSE SERPL-MCNC: 126 MG/DL
HCT VFR BLD CALC: 44 %
HDLC SERPL-MCNC: 59 MG/DL
HGB BLD-MCNC: 13.9 G/DL
IMM GRANULOCYTES NFR BLD AUTO: 0.1 %
LDLC SERPL CALC-MCNC: 133 MG/DL
LYMPHOCYTES # BLD AUTO: 2.64 K/UL
LYMPHOCYTES NFR BLD AUTO: 29.4 %
MAN DIFF?: NORMAL
MCHC RBC-ENTMCNC: 27.3 PG
MCHC RBC-ENTMCNC: 31.6 GM/DL
MCV RBC AUTO: 86.3 FL
MICROALBUMIN 24H UR DL<=1MG/L-MCNC: <1.2 MG/DL
MICROALBUMIN/CREAT 24H UR-RTO: NORMAL MG/G
MONOCYTES # BLD AUTO: 0.73 K/UL
MONOCYTES NFR BLD AUTO: 8.1 %
NEUTROPHILS # BLD AUTO: 5.36 K/UL
NEUTROPHILS NFR BLD AUTO: 59.7 %
NONHDLC SERPL-MCNC: 160 MG/DL
PLATELET # BLD AUTO: 307 K/UL
POTASSIUM SERPL-SCNC: 4.6 MMOL/L
PROT SERPL-MCNC: 7.1 G/DL
RBC # BLD: 5.1 M/UL
RBC # FLD: 14 %
SODIUM SERPL-SCNC: 142 MMOL/L
TRIGL SERPL-MCNC: 136 MG/DL
TSH SERPL-ACNC: 2.4 UIU/ML
VIT B12 SERPL-MCNC: 573 PG/ML
WBC # FLD AUTO: 8.98 K/UL

## 2022-06-30 ENCOUNTER — APPOINTMENT (OUTPATIENT)
Dept: BREAST CENTER | Facility: CLINIC | Age: 67
End: 2022-06-30

## 2022-06-30 VITALS
HEART RATE: 86 BPM | OXYGEN SATURATION: 97 % | WEIGHT: 275 LBS | SYSTOLIC BLOOD PRESSURE: 129 MMHG | DIASTOLIC BLOOD PRESSURE: 77 MMHG | BODY MASS INDEX: 50.46 KG/M2

## 2022-06-30 PROCEDURE — 99213 OFFICE O/P EST LOW 20 MIN: CPT

## 2022-06-30 NOTE — REASON FOR VISIT
[Follow-Up: _____] : a [unfilled] follow-up visit [FreeTextEntry1] : The patient comes in with a personal history of a left breast cancer diagnosed in 2007 for which she underwent a partial mastectomy in March 2007 for 3.5 mm moderately differentiated invasive duct cancer which was ER/SD positive HER-2/benny negative with 1 negative sentinel lymph node making this a pathologic prognostic stage IA breast cancer.  She received hypofractionated radiation down at [facility].  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  She was placed on tamoxifen.  Stereotactic left breast core biopsy for calcifications in June 2018 just showed fat necrosis and stromal fibrosis.  She comes in for routine yearly follow-up and gets yearly mammography and ultrasound in [place].

## 2022-06-30 NOTE — PHYSICAL EXAM
[Normocephalic] : normocephalic [Atraumatic] : atraumatic [EOMI] : extra ocular movement intact [Supple] : supple [No Supraclavicular Adenopathy] : no supraclavicular adenopathy [No Cervical Adenopathy] : no cervical adenopathy [Examined in the supine and seated position] : examined in the supine and seated position [No dominant masses] : no dominant masses in right breast  [No dominant masses] : no dominant masses left breast [No Nipple Retraction] : no left nipple retraction [No Nipple Discharge] : no left nipple discharge [Breast Mass Right Breast ___cm] : no masses [Breast Mass Left Breast ___cm] : no masses [No Axillary Lymphadenopathy] : no left axillary lymphadenopathy [No Edema] : no edema [No Rashes] : no rashes [No Ulceration] : no ulceration [Breast Nipple Inversion] : nipples not inverted [Breast Nipple Retraction] : nipples not retracted [Breast Nipple Flattening] : nipples not flattened [Breast Nipple Fissures] : nipples not fissured [Breast Abnormal Lactation (Galactorrhea)] : no galactorrhea [Breast Abnormal Secretion Bloody Fluid] : no bloody discharge [Breast Abnormal Secretion Serous Fluid] : no serous discharge [Breast Abnormal Secretion Opalescent Fluid] : no milky discharge [de-identified] : On exam, the patient has a large ptotic D-cup breast on the right and a much smaller B cup breast on the left secondary to the 2 wide excisions and prior radiation therapy.  On palpation, she has typical radiation and postop changes to the left breast but no areas suspicious for recurrence.  She has no axillary, supraclavicular, or cervical adenopathy. [de-identified] : Status post 2 partial mastectomies and prior radiation therapy with significantly smaller breast but no evidence of recurrence

## 2022-06-30 NOTE — HISTORY OF PRESENT ILLNESS
[FreeTextEntry1] : The patient is a 67-year-old G2, P2 postmenopausal white female.  She had her first child at age 32.  She never took any hormone replacement therapy after menopause.  She has no family history of breast or ovarian cancer.  The patient was initially diagnosed with a left breast cancer in 2005 and underwent a left breast lower outer quadrant partial mastectomy on March 22, 2007 for 3.5 mm moderately differentiated invasive duct cancer which was ER/GA positive HER-2/benny negative.  [place] node performed at that time was negative.  This was a pathologic prognostic stage IA breast cancer.  I attempted a MammoSite balloon catheter but had to abort the procedure and she had hypofractionated radiation down at [facility] through Dr. Berkowitz.  She then developed a left breast recurrent cancer in 2011 which was a low-grade DCIS which was ER/GA positive and I performed a repeat wide excision on December 13, 2011.  She did not receive any further radiation.  She did undergo comprehensive BRCA testing in November 2011 which was negative.  She did not receive any further radiation and was put on tamoxifen by Dr. Hugh Patel.  She then underwent a left breast stereotactic core biopsy for calcifications on June 27, 2018 showing fat necrosis and stromal fibrosis.  She developed some left breast erythema as well as fever and chills and was seen by Dr. Johnson the December 2019 and placed on antibiotics and also had a punch biopsy of the skin which showed chronic dermal inflammation.  The redness resolved spontaneously.  She stopped tamoxifen after 10 years of treatment in 2021.  She comes in for routine follow-up and continues to get yearly mammography and ultrasound in [place].

## 2022-07-13 ENCOUNTER — APPOINTMENT (OUTPATIENT)
Dept: ENDOCRINOLOGY | Facility: CLINIC | Age: 67
End: 2022-07-13

## 2022-07-13 VITALS
HEIGHT: 61.9 IN | BODY MASS INDEX: 50.61 KG/M2 | DIASTOLIC BLOOD PRESSURE: 80 MMHG | SYSTOLIC BLOOD PRESSURE: 140 MMHG | OXYGEN SATURATION: 97 % | WEIGHT: 275 LBS | HEART RATE: 80 BPM | TEMPERATURE: 98.2 F

## 2022-07-13 VITALS — SYSTOLIC BLOOD PRESSURE: 120 MMHG | DIASTOLIC BLOOD PRESSURE: 60 MMHG

## 2022-07-13 LAB
GLUCOSE BLDC GLUCOMTR-MCNC: 87
HBA1C MFR BLD HPLC: 6.7

## 2022-07-13 PROCEDURE — 99214 OFFICE O/P EST MOD 30 MIN: CPT | Mod: 25

## 2022-07-13 PROCEDURE — 83036 HEMOGLOBIN GLYCOSYLATED A1C: CPT | Mod: QW

## 2022-07-13 PROCEDURE — 82962 GLUCOSE BLOOD TEST: CPT

## 2022-07-14 LAB
25(OH)D3 SERPL-MCNC: 52 NG/ML
ALBUMIN SERPL ELPH-MCNC: 4.6 G/DL
ALP BLD-CCNC: 81 U/L
ALT SERPL-CCNC: 30 U/L
ANION GAP SERPL CALC-SCNC: 16 MMOL/L
AST SERPL-CCNC: 21 U/L
BASOPHILS # BLD AUTO: 0.06 K/UL
BASOPHILS NFR BLD AUTO: 0.6 %
BILIRUB SERPL-MCNC: <0.2 MG/DL
BUN SERPL-MCNC: 23 MG/DL
CALCIUM SERPL-MCNC: 10.4 MG/DL
CHLORIDE SERPL-SCNC: 107 MMOL/L
CHOLEST SERPL-MCNC: 219 MG/DL
CO2 SERPL-SCNC: 19 MMOL/L
CREAT SERPL-MCNC: 0.93 MG/DL
CREAT SPEC-SCNC: 76 MG/DL
EGFR: 67 ML/MIN/1.73M2
EOSINOPHIL # BLD AUTO: 0.24 K/UL
EOSINOPHIL NFR BLD AUTO: 2.3 %
FOLATE SERPL-MCNC: >20 NG/ML
GLUCOSE SERPL-MCNC: 120 MG/DL
HCT VFR BLD CALC: 44.2 %
HDLC SERPL-MCNC: 54 MG/DL
HGB BLD-MCNC: 13.7 G/DL
IMM GRANULOCYTES NFR BLD AUTO: 0.4 %
LDLC SERPL CALC-MCNC: 133 MG/DL
LYMPHOCYTES # BLD AUTO: 2.84 K/UL
LYMPHOCYTES NFR BLD AUTO: 27.6 %
MAN DIFF?: NORMAL
MCHC RBC-ENTMCNC: 26.7 PG
MCHC RBC-ENTMCNC: 31 GM/DL
MCV RBC AUTO: 86.2 FL
MICROALBUMIN 24H UR DL<=1MG/L-MCNC: <1.2 MG/DL
MICROALBUMIN/CREAT 24H UR-RTO: NORMAL MG/G
MONOCYTES # BLD AUTO: 0.82 K/UL
MONOCYTES NFR BLD AUTO: 8 %
NEUTROPHILS # BLD AUTO: 6.29 K/UL
NEUTROPHILS NFR BLD AUTO: 61.1 %
NONHDLC SERPL-MCNC: 164 MG/DL
PLATELET # BLD AUTO: 299 K/UL
POTASSIUM SERPL-SCNC: 4.8 MMOL/L
PROT SERPL-MCNC: 7.3 G/DL
RBC # BLD: 5.13 M/UL
RBC # FLD: 14.8 %
SODIUM SERPL-SCNC: 141 MMOL/L
TRIGL SERPL-MCNC: 156 MG/DL
TSH SERPL-ACNC: 1.95 UIU/ML
VIT B12 SERPL-MCNC: 510 PG/ML
WBC # FLD AUTO: 10.29 K/UL

## 2022-07-14 NOTE — ASSESSMENT
[FreeTextEntry1] : 67 y.o. female with h/o Type 2 DM, HTN, hyperlipidemia, obesity and vitamin D def.\par \par 1. Type 2 DM- Good control with Hba1c of 6.7% today. Encouraged a carbohydrate consistent diet and exercise. Will continue Metformin 1,000 mg BID and Victoza 1.8 mg SQ daily. Discussed switching to weekly GLP-1 agonist so we can titrate at higher doses which may assist more with weight loss. For now, she prefers to continue current regimen until she returns from [facility] then will start Ozempic 0.5 mg SQ weekly in place of Victoza and titrate up. Will continue Jardiance 10 mg 1/2 tab daily. Advised about possible side effect of genital mycotic infections. Will check CMP and urine alb/cr ratio. \par \par 2. HTN- BP is at goal and will continue ARB.  \par \par 3. Hyperlipidemia- Recommend starting statin which is also helpful for CV risk reduction. Patient has declined statin. Will check lipids. \par \par 4. Obesity- Encouraged diet changes and exercise. Will continue GLP-1 agonist (discussed switching to weekly agent and titrate up the dose) and will continue SGLT-2 inhibitor. Per orthopedics, knee replacement pending weight loss. \par \par 5. Vitamin D def- Will check 25 vitamin D level and will continue supplement.\par \par 6. Bone Health- DEXA scan performed in July 2021 is normal with spine 2.0 (arthritic changes), left femoral neck 1.0 and total hip 2.4, and 1.3 radius 0.5. Average risk of fracture. Encouraged weight bearing activity. Will monitor for now and repeat DEXA scan in 2 to 4 years. . \par \par Recommend baseline cardiology evaluation\par Follow up in 3 to 4 months\par \par Counseling: The patient was counseled on carbohydrate consistent diet, diabetes foot care, long term vascular complications of diabetes, importance of diet and exercise to improve glycemic control, achieve weight loss and improve cardiovascular health. \par Risks and benefits of incretin mimetic therapy were discussed with the patient including nausea, pancreatitis and potential risk of medullary thyroid cancer. \par

## 2022-07-14 NOTE — HISTORY OF PRESENT ILLNESS
[FreeTextEntry1] : 67 y.o. female with h/o Type 2 DM, HTN and hyperlipidemia here for follow up visit. Reports COVID-19 infection in January 2021. Did receive monoclonal antibody infusion. Now s/p COVID-19 Pfizer vaccine X 2 plus booster. Reports left breast infection in December 2019. Frustrated with weight but weight is stable since last visit. Had joined weight loss program at [place] and lost 22 pounds in 2018. Checks FS daily in the AM. Fasting blood glucose levels are 128 to 162. Taking Metformin 500 mg 2 BID and Victoza 1.8 mg SQ daily. Taking Jardiance 10 mg 1/2 tab daily (reduced secondary to frequent urination). Stopped glimepiride since May 8th 2017. Feeling good. No polyuria and no polydipsia. No SOB or CP. UTD with ophthalmology (6/2022) and no retinopathy. Reports change in vision. Does get heel cracking of feet. Saw podiatry in the past, and diagnosed with tinea pedis. No proteinuria. No macrovascular complications. Has declined statin. C/o b/l knee pain but does some walking. Received steroid injection in October 2021 and in March 2022. Tried gel shot but not helpful. Planning for right knee replacement; however delayed now. No exercise. \par \par Diet:\par For breakfast: eggs with English muffin\par For lunch: boiled egg with cheese\par For dinner: fish, strings beans or salad and sometimes with potato, stuffed vegetables with rice\par Snacks: fruits with banana or grapefruit but does like sweets\par \par Regarding vitamin D def, takes  MVI daily.\par \par Bone Health- DEXA scan performed in April 2018 is normal with spine 2.1 (arthritic changes), left femoral neck 0.4 and total hip 2.1, and 1.3 radius 0.0. DEXA scan performed in July 2021 is normal with spine 2.0 (arthritic changes), left femoral neck 1.0 and total hip 2.4, and 1.3 radius 0.5.

## 2022-07-14 NOTE — PHYSICAL EXAM
[Alert] : alert [Obese] : obese [No Acute Distress] : no acute distress [Normal Sclera/Conjunctiva] : normal sclera/conjunctiva [EOMI] : extra ocular movement intact [Normal Oropharynx] : the oropharynx was normal [No LAD] : no lymphadenopathy [Thyroid Not Enlarged] : the thyroid was not enlarged [No Thyroid Nodules] : no palpable thyroid nodules [No Respiratory Distress] : no respiratory distress [No Accessory Muscle Use] : no accessory muscle use [Clear to Auscultation] : lungs were clear to auscultation bilaterally [Normal S1, S2] : normal S1 and S2 [Normal Rate] : heart rate was normal [Regular Rhythm] : with a regular rhythm [No Edema] : no peripheral edema [Pedal Pulses Normal] : the pedal pulses are present [Normal Bowel Sounds] : normal bowel sounds [Not Tender] : non-tender [Not Distended] : not distended [Soft] : abdomen soft [Normal Anterior Cervical Nodes] : no anterior cervical lymphadenopathy [No Spinal Tenderness] : no spinal tenderness [No Stigmata of Cushings Syndrome] : no stigmata of Cushings Syndrome [No Clubbing, Cyanosis] : no clubbing  or cyanosis of the fingernails [No Rash] : no rash [Right foot was examined, including] : right foot ~C was examined, including visual inspection with sensory and pulse exams [Left foot was examined, including] : left foot ~C was examined, including visual inspection with sensory and pulse exams [Normal] : normal [2+] : 2+ in the dorsalis pedis [Normal Reflexes] : deep tendon reflexes were 2+ and symmetric [Normal Affect] : the affect was normal [Normal Mood] : the mood was normal [Kyphosis] : no kyphosis present [Acanthosis Nigricans] : no acanthosis nigricans [Diminished Throughout Both Feet] : normal tactile sensation with monofilament testing throughout both feet [de-identified] : varicose veins b/l [FreeTextEntry1] : callus with tinea pedis [FreeTextEntry2] : hammer toe [FreeTextEntry5] : callus with tinea pedis

## 2022-10-21 ENCOUNTER — APPOINTMENT (OUTPATIENT)
Dept: VASCULAR SURGERY | Facility: CLINIC | Age: 67
End: 2022-10-21

## 2022-10-21 VITALS
BODY MASS INDEX: 49.69 KG/M2 | HEIGHT: 62 IN | SYSTOLIC BLOOD PRESSURE: 152 MMHG | WEIGHT: 270 LBS | DIASTOLIC BLOOD PRESSURE: 85 MMHG | HEART RATE: 78 BPM

## 2022-10-21 VITALS — SYSTOLIC BLOOD PRESSURE: 145 MMHG | TEMPERATURE: 96.98 F | DIASTOLIC BLOOD PRESSURE: 80 MMHG | HEART RATE: 77 BPM

## 2022-10-21 PROCEDURE — 93970 EXTREMITY STUDY: CPT

## 2022-10-21 PROCEDURE — 99203 OFFICE O/P NEW LOW 30 MIN: CPT

## 2022-10-26 ENCOUNTER — NON-APPOINTMENT (OUTPATIENT)
Age: 67
End: 2022-10-26

## 2022-10-26 ENCOUNTER — APPOINTMENT (OUTPATIENT)
Dept: CARDIOLOGY | Facility: CLINIC | Age: 67
End: 2022-10-26

## 2022-10-26 VITALS
HEIGHT: 62 IN | OXYGEN SATURATION: 97 % | BODY MASS INDEX: 49.69 KG/M2 | RESPIRATION RATE: 14 BRPM | WEIGHT: 270 LBS | HEART RATE: 84 BPM | TEMPERATURE: 97.2 F | SYSTOLIC BLOOD PRESSURE: 124 MMHG | DIASTOLIC BLOOD PRESSURE: 77 MMHG

## 2022-10-26 DIAGNOSIS — R94.31 ABNORMAL ELECTROCARDIOGRAM [ECG] [EKG]: ICD-10-CM

## 2022-10-26 PROCEDURE — 99204 OFFICE O/P NEW MOD 45 MIN: CPT | Mod: 25

## 2022-10-26 PROCEDURE — 93000 ELECTROCARDIOGRAM COMPLETE: CPT

## 2022-10-31 NOTE — HISTORY OF PRESENT ILLNESS
[FreeTextEntry1] : Patient is a 67 year old woman with a history of breast cancer status post lumpectomy, HTN, hyperlipidemia, Diabetes mellitus, and family history of CAD who presents today for evaluation of HTN. She states that she is limited after 1 flight of stairs secondary to knee pain. She otherwise denies any dyspnea at rest, chest pain, palpitations, headaches, and dizziness.

## 2022-10-31 NOTE — PHYSICAL EXAM
[Normal Rate] : normal [Rhythm Regular] : regular [Normal S1] : normal S1 [Normal S2] : normal S2 [No Murmur] : no murmurs heard [No Pitting Edema] : no pitting edema present [Rt] : varicose veins of the right leg noted [Well Developed] : well developed [Well Nourished] : well nourished [No Acute Distress] : no acute distress [Normal Conjunctiva] : normal conjunctiva [Normal Venous Pressure] : normal venous pressure [No Carotid Bruit] : no carotid bruit [S3] : no S3 [Right Carotid Bruit] : no bruit heard over the right carotid [Left Carotid Bruit] : no bruit heard over the left carotid [Bruit] : no bruit heard [Clear Lung Fields] : clear lung fields [Good Air Entry] : good air entry [No Respiratory Distress] : no respiratory distress  [Soft] : abdomen soft [Non Tender] : non-tender [Normal Bowel Sounds] : normal bowel sounds [Normal Gait] : normal gait [No Edema] : no edema [Moves all extremities] : moves all extremities [No Rash] : no rash [No Focal Deficits] : no focal deficits [Alert and Oriented] : alert and oriented [Normal memory] : normal memory [de-identified] : Extraocular muscles intact. Anicteric sclerae. [de-identified] : She was wearing a face mask during the examination. [de-identified] : No visible skin ulcers.

## 2022-10-31 NOTE — CARDIOLOGY SUMMARY
[de-identified] : 10/26/2022: Sinus Rhythm at 78 beats per minute with nonspecific T wave abnormality.

## 2022-10-31 NOTE — DISCUSSION/SUMMARY
[FreeTextEntry1] : IMPRESSION: Ms. CAMPOS is a 67 year old woman with a history of breast cancer status post lumpectomy, HTN, hyperlipidemia, Diabetes mellitus, and family history of CAD who presents today for evaluation of HTN. \par \par PLAN:\par 1. Her blood pressure is good, thus she will continue on Benicar 20 mg daily in addition to diet modification.\par 2. Her most recent LDL was above goal. She would benefit from being on a statin as her goal LDL is at least less than 100. She will modify her diet and will discuss with you further about being started on a statin.\par 3. I have asked her to schedule an echocardiogram given her HTN. She had nonspecific T wave abnormalities on her ECG and given her risk factors for CAD, I have asked her to schedule a nuclear stress test.  \par 4. She will follow up with me after her cardiac tests have been performed.  [EKG obtained to assist in diagnosis and management of assessed problem(s)] : EKG obtained to assist in diagnosis and management of assessed problem(s)

## 2022-11-16 ENCOUNTER — APPOINTMENT (OUTPATIENT)
Dept: ENDOCRINOLOGY | Facility: CLINIC | Age: 67
End: 2022-11-16

## 2022-11-16 VITALS
BODY MASS INDEX: 49.38 KG/M2 | HEART RATE: 77 BPM | SYSTOLIC BLOOD PRESSURE: 128 MMHG | DIASTOLIC BLOOD PRESSURE: 68 MMHG | TEMPERATURE: 97.6 F | WEIGHT: 270 LBS | OXYGEN SATURATION: 98 %

## 2022-11-16 DIAGNOSIS — Z23 ENCOUNTER FOR IMMUNIZATION: ICD-10-CM

## 2022-11-16 LAB
GLUCOSE BLDC GLUCOMTR-MCNC: 84
HBA1C MFR BLD HPLC: 6.1

## 2022-11-16 PROCEDURE — 90662 IIV NO PRSV INCREASED AG IM: CPT

## 2022-11-16 PROCEDURE — 82962 GLUCOSE BLOOD TEST: CPT

## 2022-11-16 PROCEDURE — 99214 OFFICE O/P EST MOD 30 MIN: CPT | Mod: 25

## 2022-11-16 PROCEDURE — G0008: CPT

## 2022-11-16 PROCEDURE — 83036 HEMOGLOBIN GLYCOSYLATED A1C: CPT | Mod: QW

## 2022-11-16 RX ORDER — ALCOHOL ANTISEPTIC PADS
33G X 5 MM PADS, MEDICATED (EA) TOPICAL
Qty: 100 | Refills: 3 | Status: DISCONTINUED | COMMUNITY
Start: 2021-02-22 | End: 2022-11-16

## 2022-11-16 RX ORDER — PEN NEEDLE, DIABETIC 29 G X1/2"
32G X 4 MM NEEDLE, DISPOSABLE MISCELLANEOUS
Qty: 1 | Refills: 3 | Status: DISCONTINUED | COMMUNITY
Start: 2017-06-13 | End: 2022-11-16

## 2022-11-16 RX ORDER — AMOXICILLIN 500 MG/1
500 CAPSULE ORAL
Qty: 21 | Refills: 0 | Status: DISCONTINUED | COMMUNITY
Start: 2022-06-01

## 2022-11-16 NOTE — HISTORY OF PRESENT ILLNESS
[FreeTextEntry1] : 67 y.o. female with h/o Type 2 DM, HTN and hyperlipidemia here for follow up visit. Reports COVID-19 infection in January 2021. Did receive monoclonal antibody infusion. Now s/p COVID-19 Pfizer vaccine X 2 plus booster. Reports left breast infection in December 2019. Frustrated with weight but weight is down since last visit. Had joined weight loss program at [place] and lost 22 pounds in 2018. Checks FS daily in the AM. Fasting blood glucose levels are 103 to 131 which are better since transitioning to Ozempic. Taking Metformin 500 mg 2 BID and Ozempic 1 mg SQ weekly (started Ozempic in 9/2022 and stopped Victoza). Taking Jardiance 10 mg 1/2 tab daily (reduced secondary to frequent urination). Stopped glimepiride since May 8th 2017. Feeling good. No polyuria and no polydipsia. No SOB or CP. UTD with ophthalmology (6/2022) and no retinopathy. Reports change in vision. Does get heel cracking of feet. Saw podiatry in the past, and diagnosed with tinea pedis. No proteinuria. No macrovascular complications. Has declined statin. C/o b/l knee pain but does some walking. Received steroid injection in October 2021 and in March 2022. Tried gel shot but not helpful. Planning for right knee replacement; however delayed now because needs lose more weight. No exercise. Saw cardiology in October 2022. \par \par Diet:\par For breakfast: eggs with English muffin\par For lunch: boiled egg with cheese\par For dinner: fish, strings beans or salad and sometimes with potato, stuffed vegetables with rice\par Snacks: fruits with banana or grapefruit but does like sweets\par \par Regarding vitamin D def, takes  MVI daily.\par \par Bone Health- DEXA scan performed in April 2018 is normal with spine 2.1 (arthritic changes), left femoral neck 0.4 and total hip 2.1, and 1.3 radius 0.0. DEXA scan performed in July 2021 is normal with spine 2.0 (arthritic changes), left femoral neck 1.0 and total hip 2.4, and 1.3 radius 0.5.

## 2022-11-16 NOTE — ASSESSMENT
[FreeTextEntry1] : 67 y.o. female with h/o Type 2 DM, HTN, hyperlipidemia, obesity and vitamin D def.\par \par 1. Type 2 DM- Good control with Hba1c of 6.1% today. Encouraged a carbohydrate consistent diet and exercise. Will continue Metformin 1,000 mg BID and Ozempic 1 mg SQ weekly. Will continue Jardiance 10 mg 1/2 tab daily. Advised about possible side effect of genital mycotic infections. Discussed option of increasing Ozempic to 2 mg SQ weekly if weight loss plateaus. Will check CMP and urine alb/cr ratio. \par \par 2. HTN- BP is at goal and will continue ARB.  \par \par 3. Hyperlipidemia- Recommend starting statin which is also helpful for CV risk reduction. Patient has declined statin. Will check lipids. \par \par 4. Obesity- Encouraged diet changes and exercise. Will continue GLP-1 agonist and will continue SGLT-2 inhibitor. Per orthopedics, knee replacement pending weight loss. \par \par 5. Vitamin D def- Will check 25 vitamin D level and will continue supplement.\par \par 6. Bone Health- DEXA scan performed in July 2021 is normal with spine 2.0 (arthritic changes), left femoral neck 1.0 and total hip 2.4, and 1.3 radius 0.5. Average risk of fracture. Encouraged weight bearing activity. Will monitor for now and repeat DEXA scan in 2023.\par \par Follow up in 3 to 4 months\par \par Counseling: The patient was counseled on carbohydrate consistent diet, diabetes foot care, long term vascular complications of diabetes, importance of diet and exercise to improve glycemic control, achieve weight loss and improve cardiovascular health. \par Risks and benefits of incretin mimetic therapy were discussed with the patient including nausea, pancreatitis and potential risk of medullary thyroid cancer. \par  [Long Term Vascular Complications] : long term vascular complications of diabetes [Carbohydrate Consistent Diet] : carbohydrate consistent diet [Importance of Diet and Exercise] : importance of diet and exercise to improve glycemic control, achieve weight loss and improve cardiovascular health

## 2022-11-16 NOTE — REVIEW OF SYSTEMS
[Recent Weight Loss (___ Lbs)] : recent weight loss: [unfilled] lbs [Negative] : Endocrine [Fatigue] : no fatigue [Recent Weight Gain (___ Lbs)] : no recent weight gain [Swelling] : no swelling [FreeTextEntry3] : change in reading vision [FreeTextEntry9] : knee pain  [de-identified] : varicose veins

## 2022-11-16 NOTE — PHYSICAL EXAM
[Alert] : alert [Obese] : obese [No Acute Distress] : no acute distress [Normal Sclera/Conjunctiva] : normal sclera/conjunctiva [EOMI] : extra ocular movement intact [Normal Oropharynx] : the oropharynx was normal [No LAD] : no lymphadenopathy [Thyroid Not Enlarged] : the thyroid was not enlarged [No Thyroid Nodules] : no palpable thyroid nodules [No Respiratory Distress] : no respiratory distress [No Accessory Muscle Use] : no accessory muscle use [Clear to Auscultation] : lungs were clear to auscultation bilaterally [Normal S1, S2] : normal S1 and S2 [Normal Rate] : heart rate was normal [Regular Rhythm] : with a regular rhythm [No Edema] : no peripheral edema [Pedal Pulses Normal] : the pedal pulses are present [Normal Bowel Sounds] : normal bowel sounds [Not Tender] : non-tender [Not Distended] : not distended [Soft] : abdomen soft [Normal Anterior Cervical Nodes] : no anterior cervical lymphadenopathy [No Spinal Tenderness] : no spinal tenderness [No Stigmata of Cushings Syndrome] : no stigmata of Cushings Syndrome [No Clubbing, Cyanosis] : no clubbing  or cyanosis of the fingernails [No Rash] : no rash [Right foot was examined, including] : right foot ~C was examined, including visual inspection with sensory and pulse exams [Left foot was examined, including] : left foot ~C was examined, including visual inspection with sensory and pulse exams [Normal] : normal [2+] : 2+ in the dorsalis pedis [Normal Reflexes] : deep tendon reflexes were 2+ and symmetric [Normal Affect] : the affect was normal [Normal Mood] : the mood was normal [Kyphosis] : no kyphosis present [Acanthosis Nigricans] : no acanthosis nigricans [Diminished Throughout Both Feet] : normal tactile sensation with monofilament testing throughout both feet [de-identified] : varicose veins b/l [FreeTextEntry1] : callus with tinea pedis [FreeTextEntry2] : hammer toe [FreeTextEntry5] : callus with tinea pedis

## 2022-11-17 LAB
25(OH)D3 SERPL-MCNC: 43.6 NG/ML
ALBUMIN SERPL ELPH-MCNC: 4.8 G/DL
ALP BLD-CCNC: 86 U/L
ALT SERPL-CCNC: 29 U/L
ANION GAP SERPL CALC-SCNC: 13 MMOL/L
AST SERPL-CCNC: 19 U/L
BASOPHILS # BLD AUTO: 0.06 K/UL
BASOPHILS NFR BLD AUTO: 0.6 %
BILIRUB SERPL-MCNC: 0.2 MG/DL
BUN SERPL-MCNC: 23 MG/DL
CALCIUM SERPL-MCNC: 10.4 MG/DL
CHLORIDE SERPL-SCNC: 107 MMOL/L
CHOLEST SERPL-MCNC: 216 MG/DL
CO2 SERPL-SCNC: 22 MMOL/L
CREAT SERPL-MCNC: 0.98 MG/DL
CREAT SPEC-SCNC: 118 MG/DL
EGFR: 63 ML/MIN/1.73M2
EOSINOPHIL # BLD AUTO: 0.22 K/UL
EOSINOPHIL NFR BLD AUTO: 2.2 %
GLUCOSE SERPL-MCNC: 101 MG/DL
HCT VFR BLD CALC: 44.1 %
HDLC SERPL-MCNC: 52 MG/DL
HGB BLD-MCNC: 13.9 G/DL
IMM GRANULOCYTES NFR BLD AUTO: 0.2 %
LDLC SERPL CALC-MCNC: 135 MG/DL
LYMPHOCYTES # BLD AUTO: 2.93 K/UL
LYMPHOCYTES NFR BLD AUTO: 29.8 %
MAN DIFF?: NORMAL
MCHC RBC-ENTMCNC: 27.5 PG
MCHC RBC-ENTMCNC: 31.5 GM/DL
MCV RBC AUTO: 87.3 FL
MICROALBUMIN 24H UR DL<=1MG/L-MCNC: 1.2 MG/DL
MICROALBUMIN/CREAT 24H UR-RTO: 10 MG/G
MONOCYTES # BLD AUTO: 0.77 K/UL
MONOCYTES NFR BLD AUTO: 7.8 %
NEUTROPHILS # BLD AUTO: 5.84 K/UL
NEUTROPHILS NFR BLD AUTO: 59.4 %
NONHDLC SERPL-MCNC: 164 MG/DL
PLATELET # BLD AUTO: 322 K/UL
POTASSIUM SERPL-SCNC: 4.6 MMOL/L
PROT SERPL-MCNC: 7.6 G/DL
RBC # BLD: 5.05 M/UL
RBC # FLD: 14.8 %
SODIUM SERPL-SCNC: 142 MMOL/L
TRIGL SERPL-MCNC: 147 MG/DL
TSH SERPL-ACNC: 2.09 UIU/ML
WBC # FLD AUTO: 9.84 K/UL

## 2023-01-18 ENCOUNTER — APPOINTMENT (OUTPATIENT)
Dept: CARDIOLOGY | Facility: CLINIC | Age: 68
End: 2023-01-18
Payer: MEDICARE

## 2023-01-18 PROCEDURE — A9500: CPT

## 2023-01-18 PROCEDURE — 93015 CV STRESS TEST SUPVJ I&R: CPT

## 2023-01-18 PROCEDURE — 93306 TTE W/DOPPLER COMPLETE: CPT

## 2023-01-18 PROCEDURE — 78452 HT MUSCLE IMAGE SPECT MULT: CPT

## 2023-03-23 ENCOUNTER — APPOINTMENT (OUTPATIENT)
Dept: CARDIOLOGY | Facility: CLINIC | Age: 68
End: 2023-03-23
Payer: MEDICARE

## 2023-03-23 ENCOUNTER — NON-APPOINTMENT (OUTPATIENT)
Age: 68
End: 2023-03-23

## 2023-03-23 VITALS — DIASTOLIC BLOOD PRESSURE: 74 MMHG | SYSTOLIC BLOOD PRESSURE: 130 MMHG

## 2023-03-23 VITALS
RESPIRATION RATE: 16 BRPM | BODY MASS INDEX: 48.95 KG/M2 | SYSTOLIC BLOOD PRESSURE: 152 MMHG | WEIGHT: 266 LBS | TEMPERATURE: 97.9 F | OXYGEN SATURATION: 98 % | HEIGHT: 62 IN | HEART RATE: 79 BPM | DIASTOLIC BLOOD PRESSURE: 79 MMHG

## 2023-03-23 PROCEDURE — 93000 ELECTROCARDIOGRAM COMPLETE: CPT

## 2023-03-23 PROCEDURE — 99214 OFFICE O/P EST MOD 30 MIN: CPT | Mod: 25

## 2023-03-27 NOTE — HISTORY OF PRESENT ILLNESS
[FreeTextEntry1] : Patient is a 68 year old woman with a history of breast cancer status post lumpectomy, HTN, hyperlipidemia, Diabetes mellitus, and family history of CAD who presents today for follow up of HTN. She has been feeling well, she denies any chest pain, shortness of breath, palpitations, headaches or dizziness. After starting on the Rosuvastatin in the beginning of February, she had right leg pain, she stopped losing weight on the Ozempic and her sugar levels were elevated. She stopped taking the medication and after one week, allher symptoms improved.\par

## 2023-03-27 NOTE — DISCUSSION/SUMMARY
[FreeTextEntry1] : IMPRESSION: Ms. CAMPOS is a 68 year old woman with a history of breast cancer status post lumpectomy, HTN, hyperlipidemia, Diabetes mellitus, and family history of CAD who presents today for follow up of HTN. \par \par PLAN:\par 1. Her blood pressure was fine when it was repeated at the time of the physical examination. She will continue on Benicar 20 mg daily in addition to diet modification.\par 2. She will schedule a cardiac CT to evaluate for any obstructive CAD given the abnormal EKG and nuclear stress test. She had poor R wave progression on her ECG that was performed today.\par 3. Her most recent LDL was above goal. She would benefit from being on a statin as her goal LDL is at least less than 100. She did not tolerate Rosuvastatin. For now, she will modify her diet and will discuss with you further about being started on a statin based on her upcoming blood work.\par 4. She will follow up with me in 4 months, or sooner if she is symptomatic or if there are any abnormalities on her Cardiac CT. [EKG obtained to assist in diagnosis and management of assessed problem(s)] : EKG obtained to assist in diagnosis and management of assessed problem(s)

## 2023-03-27 NOTE — PHYSICAL EXAM
[Well Developed] : well developed [Well Nourished] : well nourished [No Acute Distress] : no acute distress [Normal Conjunctiva] : normal conjunctiva [Normal Venous Pressure] : normal venous pressure [No Carotid Bruit] : no carotid bruit [Normal Rate] : normal [Rhythm Regular] : regular [Normal S1] : normal S1 [Normal S2] : normal S2 [No Murmur] : no murmurs heard [No Pitting Edema] : no pitting edema present [Rt] : varicose veins of the right leg noted [Clear Lung Fields] : clear lung fields [Good Air Entry] : good air entry [No Respiratory Distress] : no respiratory distress  [Soft] : abdomen soft [Non Tender] : non-tender [Normal Bowel Sounds] : normal bowel sounds [Normal Gait] : normal gait [No Edema] : no edema [No Rash] : no rash [Moves all extremities] : moves all extremities [No Focal Deficits] : no focal deficits [Alert and Oriented] : alert and oriented [Normal memory] : normal memory [S3] : no S3 [Right Carotid Bruit] : no bruit heard over the right carotid [Left Carotid Bruit] : no bruit heard over the left carotid [Bruit] : no bruit heard [de-identified] : Extraocular muscles intact. Anicteric sclerae. [de-identified] : She was wearing a face mask during the examination. [de-identified] : No visible skin ulcers.

## 2023-03-27 NOTE — CARDIOLOGY SUMMARY
[de-identified] : 3/23/2023: Sinus Rhythm at 77 beats per minute with LVH, left axis deviation, poor R wave progression, and nonspecific T wave abnormality.\par  [de-identified] : Exercise Nuclear Stress Test 1/18/2023: Poor exercise capacity (5 METS). No significant ST segment changes.  Medium sized, mild defects of the anterior wall that are fixed with normal wall motion and corrected with prone imaging, suggestive of breast attenuation artifact. There is a small, mild defect of the basal inferior wall that is reversible and only partially corrected with prone imaging,\par consistent with mild ischemia. LVEF= 82%, revealing overall preserved left ventricular ejection fraction with mild hypokinesis of the\par basal inferior wall. [de-identified] : Echocardiogram 1/18/2023:  Endocardium not well visualized, grossly normal LV systolic function. The basal inferior wall appears to be hypokinetic. EF 65-70%. Mild tricuspid regurgitation. Concentric remodeling. Borderline dilated proximal ascending aorta, measuring 3.9cm. No pulmonary HTN. PASP= 21 mmHg.\par \par

## 2023-04-05 ENCOUNTER — APPOINTMENT (OUTPATIENT)
Dept: ENDOCRINOLOGY | Facility: CLINIC | Age: 68
End: 2023-04-05
Payer: MEDICARE

## 2023-04-05 VITALS
WEIGHT: 260 LBS | DIASTOLIC BLOOD PRESSURE: 70 MMHG | HEART RATE: 79 BPM | HEIGHT: 62 IN | BODY MASS INDEX: 47.84 KG/M2 | SYSTOLIC BLOOD PRESSURE: 112 MMHG | OXYGEN SATURATION: 98 %

## 2023-04-05 LAB — HBA1C MFR BLD HPLC: 6.3

## 2023-04-05 PROCEDURE — 83036 HEMOGLOBIN GLYCOSYLATED A1C: CPT | Mod: QW

## 2023-04-05 PROCEDURE — 99215 OFFICE O/P EST HI 40 MIN: CPT | Mod: 25

## 2023-04-05 NOTE — PHYSICAL EXAM
[Alert] : alert [Obese] : obese [No Acute Distress] : no acute distress [Normal Sclera/Conjunctiva] : normal sclera/conjunctiva [EOMI] : extra ocular movement intact [Normal Oropharynx] : the oropharynx was normal [No LAD] : no lymphadenopathy [Thyroid Not Enlarged] : the thyroid was not enlarged [No Thyroid Nodules] : no palpable thyroid nodules [No Respiratory Distress] : no respiratory distress [No Accessory Muscle Use] : no accessory muscle use [Clear to Auscultation] : lungs were clear to auscultation bilaterally [Normal S1, S2] : normal S1 and S2 [Normal Rate] : heart rate was normal [Regular Rhythm] : with a regular rhythm [No Edema] : no peripheral edema [Pedal Pulses Normal] : the pedal pulses are present [Normal Bowel Sounds] : normal bowel sounds [Not Tender] : non-tender [Not Distended] : not distended [Soft] : abdomen soft [Normal Anterior Cervical Nodes] : no anterior cervical lymphadenopathy [No Spinal Tenderness] : no spinal tenderness [No Stigmata of Cushings Syndrome] : no stigmata of Cushings Syndrome [No Clubbing, Cyanosis] : no clubbing  or cyanosis of the fingernails [No Rash] : no rash [Right foot was examined, including] : right foot ~C was examined, including visual inspection with sensory and pulse exams [Left foot was examined, including] : left foot ~C was examined, including visual inspection with sensory and pulse exams [Normal] : normal [2+] : 2+ in the dorsalis pedis [Normal Reflexes] : deep tendon reflexes were 2+ and symmetric [Normal Affect] : the affect was normal [Normal Mood] : the mood was normal [Kyphosis] : no kyphosis present [Acanthosis Nigricans] : no acanthosis nigricans [Diminished Throughout Both Feet] : normal tactile sensation with monofilament testing throughout both feet [de-identified] : varicose veins b/l [FreeTextEntry2] : hammer toe [FreeTextEntry1] : callus  [FreeTextEntry5] : callus

## 2023-04-05 NOTE — REVIEW OF SYSTEMS
[Recent Weight Loss (___ Lbs)] : recent weight loss: [unfilled] lbs [Negative] : Endocrine [Fatigue] : no fatigue [Recent Weight Gain (___ Lbs)] : no recent weight gain [Swelling] : no swelling [FreeTextEntry3] : change in reading vision [FreeTextEntry9] : knee pain  [de-identified] : varicose veins

## 2023-04-05 NOTE — HISTORY OF PRESENT ILLNESS
[FreeTextEntry1] : 68 y.o. female with h/o Type 2 DM, HTN and hyperlipidemia here for follow up visit. Reports COVID-19 infection in January 2021. Did receive monoclonal antibody infusion. Now s/p COVID-19 Pfizer vaccine X 2 plus booster. Reports left breast infection in December 2019. Frustrated with weight but weight is down since last visit. Had joined weight loss program at [place] and lost 22 pounds in 2018. Checks FS daily in the AM. Fasting blood glucose levels are 113 to 134 which are better since transitioning to Ozempic. Taking Metformin 500 mg 2 BID and Ozempic 1 mg SQ weekly (started Ozempic in 9/2022 and stopped Victoza). Taking Jardiance 10 mg 1/2 tab daily (reduced secondary to frequent urination). Stopped glimepiride since May 8th 2017. \par \par Feeling good. No polyuria and no polydipsia. No SOB or CP. UTD with ophthalmology (12/2022) and no retinopathy. Reports change in vision and floaters. Does get heel cracking of feet. Saw podiatry in the past, and diagnosed with tinea pedis. No proteinuria. No macrovascular complications. C/o b/l knee pain but does some walking. Received steroid injection in October 2021 and in March 2022. Tried gel shot but not helpful. Planning for right knee replacement; however delayed because needs to lose more weight. No exercise. Saw cardiology in October 2022. \par \par Diet:\par For breakfast: eggs with English muffin\par For lunch: boiled egg with cheese\par For dinner: fish, strings beans or salad and sometimes with potato, stuffed vegetables with rice\par Snacks: fruits with banana or grapefruit but does like sweets\par \par Regarding hyperlipidemia, did not tolerate Rosuvastatin 5 mg daily secondary to muscles aches so stopped it\par \par Regarding vitamin D def, takes  MVI daily.\par \par Bone Health- DEXA scan performed in April 2018 is normal with spine 2.1 (arthritic changes), left femoral neck 0.4 and total hip 2.1, and 1.3 radius 0.0. DEXA scan performed in July 2021 is normal with spine 2.0 (arthritic changes), left femoral neck 1.0 and total hip 2.4, and 1.3 radius 0.5.

## 2023-04-05 NOTE — ASSESSMENT
[Long Term Vascular Complications] : long term vascular complications of diabetes [Carbohydrate Consistent Diet] : carbohydrate consistent diet [Importance of Diet and Exercise] : importance of diet and exercise to improve glycemic control, achieve weight loss and improve cardiovascular health [Diabetes Foot Care] : diabetes foot care [FreeTextEntry1] : 68 y.o. female with h/o Type 2 DM, HTN, hyperlipidemia, obesity and vitamin D def.\par \par 1. Type 2 DM- Good control with Hba1c of 6.3% today. Encouraged a carbohydrate consistent diet and exercise. Will decrease Metformin to 500 mg BID and will increase Ozempic to 2 mg SQ weekly. Will continue Jardiance 10 mg 1/2 tab daily. Reviewed risks and benefits of GLP-1 agonists and SGLT-2 inhibitors.  Will check CMP and urine alb/cr ratio. \par \par 2. HTN- BP is at goal and will continue ARB.  \par \par 3. Hyperlipidemia- Recommend starting statin which is also helpful for CV risk reduction. May consider trial of Pravastatin. Will check lipids. Recommend starting co-enzyme Q10\par \par 4. Obesity- Encouraged diet changes and exercise. Will increase Ozempic to 2 mg SQ weekly and will continue SGLT-2 inhibitor. Per orthopedics, knee replacement pending weight loss. \par \par 5. Vitamin D def- Will check 25 vitamin D level and will continue supplement.\par \par 6. Bone Health- DEXA scan performed in July 2021 is normal with spine 2.0 (arthritic changes), left femoral neck 1.0 and total hip 2.4, and 1.3 radius 0.5. Average risk of fracture. Encouraged weight bearing activity. Will monitor for now and repeat DEXA scan in 2023.\par \par Follow up in 3 to 4 months\par

## 2023-04-06 LAB
25(OH)D3 SERPL-MCNC: 44.5 NG/ML
ALBUMIN SERPL ELPH-MCNC: 4.5 G/DL
ALP BLD-CCNC: 81 U/L
ALT SERPL-CCNC: 19 U/L
ANION GAP SERPL CALC-SCNC: 15 MMOL/L
AST SERPL-CCNC: 16 U/L
BASOPHILS # BLD AUTO: 0.07 K/UL
BASOPHILS NFR BLD AUTO: 0.6 %
BILIRUB SERPL-MCNC: <0.2 MG/DL
BUN SERPL-MCNC: 27 MG/DL
CALCIUM SERPL-MCNC: 10.4 MG/DL
CHLORIDE SERPL-SCNC: 107 MMOL/L
CHOLEST SERPL-MCNC: 223 MG/DL
CO2 SERPL-SCNC: 20 MMOL/L
CREAT SERPL-MCNC: 0.84 MG/DL
CREAT SPEC-SCNC: 68 MG/DL
EGFR: 76 ML/MIN/1.73M2
EOSINOPHIL # BLD AUTO: 0.27 K/UL
EOSINOPHIL NFR BLD AUTO: 2.4 %
FOLATE SERPL-MCNC: 10.8 NG/ML
GLUCOSE SERPL-MCNC: 90 MG/DL
HCT VFR BLD CALC: 45.2 %
HDLC SERPL-MCNC: 61 MG/DL
HGB BLD-MCNC: 13.7 G/DL
IMM GRANULOCYTES NFR BLD AUTO: 0.2 %
LDLC SERPL CALC-MCNC: 132 MG/DL
LYMPHOCYTES # BLD AUTO: 2.88 K/UL
LYMPHOCYTES NFR BLD AUTO: 25.2 %
MAN DIFF?: NORMAL
MCHC RBC-ENTMCNC: 27.5 PG
MCHC RBC-ENTMCNC: 30.3 GM/DL
MCV RBC AUTO: 90.8 FL
MICROALBUMIN 24H UR DL<=1MG/L-MCNC: <1.2 MG/DL
MICROALBUMIN/CREAT 24H UR-RTO: NORMAL MG/G
MONOCYTES # BLD AUTO: 0.79 K/UL
MONOCYTES NFR BLD AUTO: 6.9 %
NEUTROPHILS # BLD AUTO: 7.39 K/UL
NEUTROPHILS NFR BLD AUTO: 64.7 %
NONHDLC SERPL-MCNC: 162 MG/DL
PLATELET # BLD AUTO: 297 K/UL
POTASSIUM SERPL-SCNC: 4.5 MMOL/L
PROT SERPL-MCNC: 7.2 G/DL
RBC # BLD: 4.98 M/UL
RBC # FLD: 14.6 %
SODIUM SERPL-SCNC: 142 MMOL/L
TRIGL SERPL-MCNC: 149 MG/DL
TSH SERPL-ACNC: 1.14 UIU/ML
VIT B12 SERPL-MCNC: 490 PG/ML
WBC # FLD AUTO: 11.42 K/UL

## 2023-04-06 RX ORDER — ROSUVASTATIN CALCIUM 5 MG/1
5 TABLET, FILM COATED ORAL
Qty: 90 | Refills: 0 | Status: DISCONTINUED | COMMUNITY
Start: 2023-01-27 | End: 2023-04-06

## 2023-04-21 ENCOUNTER — NON-APPOINTMENT (OUTPATIENT)
Age: 68
End: 2023-04-21

## 2023-04-26 ENCOUNTER — RX RENEWAL (OUTPATIENT)
Age: 68
End: 2023-04-26

## 2023-04-29 ENCOUNTER — RX RENEWAL (OUTPATIENT)
Age: 68
End: 2023-04-29

## 2023-05-24 ENCOUNTER — APPOINTMENT (OUTPATIENT)
Dept: CT IMAGING | Facility: CLINIC | Age: 68
End: 2023-05-24
Payer: MEDICARE

## 2023-05-24 ENCOUNTER — OUTPATIENT (OUTPATIENT)
Dept: OUTPATIENT SERVICES | Facility: HOSPITAL | Age: 68
LOS: 1 days | End: 2023-05-24
Payer: MEDICARE

## 2023-05-24 DIAGNOSIS — C50.919 MALIGNANT NEOPLASM OF UNSPECIFIED SITE OF UNSPECIFIED FEMALE BREAST: Chronic | ICD-10-CM

## 2023-05-24 DIAGNOSIS — R94.31 ABNORMAL ELECTROCARDIOGRAM [ECG] [EKG]: ICD-10-CM

## 2023-05-24 PROCEDURE — 75574 CT ANGIO HRT W/3D IMAGE: CPT | Mod: 26,MH

## 2023-05-24 PROCEDURE — 75574 CT ANGIO HRT W/3D IMAGE: CPT

## 2023-05-25 ENCOUNTER — OUTPATIENT (OUTPATIENT)
Dept: OUTPATIENT SERVICES | Facility: HOSPITAL | Age: 68
LOS: 1 days | End: 2023-05-25
Payer: MEDICARE

## 2023-05-25 ENCOUNTER — RESULT REVIEW (OUTPATIENT)
Age: 68
End: 2023-05-25

## 2023-05-25 DIAGNOSIS — C50.919 MALIGNANT NEOPLASM OF UNSPECIFIED SITE OF UNSPECIFIED FEMALE BREAST: Chronic | ICD-10-CM

## 2023-05-25 DIAGNOSIS — I25.10 ATHEROSCLEROTIC HEART DISEASE OF NATIVE CORONARY ARTERY W/OUT ANGINA PECTORIS: ICD-10-CM

## 2023-05-25 DIAGNOSIS — Z00.8 ENCOUNTER FOR OTHER GENERAL EXAMINATION: ICD-10-CM

## 2023-05-25 PROCEDURE — 0502T: CPT

## 2023-05-25 PROCEDURE — 0503T: CPT

## 2023-05-25 PROCEDURE — 0504T: CPT

## 2023-06-07 PROBLEM — I25.10 ARTERIOSCLEROSIS OF CORONARY ARTERY: Status: ACTIVE | Noted: 2023-06-07

## 2023-06-07 RX ORDER — PRAVASTATIN SODIUM 10 MG/1
10 TABLET ORAL
Qty: 90 | Refills: 3 | Status: DISCONTINUED | COMMUNITY
Start: 2023-04-06 | End: 2023-06-07

## 2023-06-23 ENCOUNTER — APPOINTMENT (OUTPATIENT)
Dept: BREAST CENTER | Facility: CLINIC | Age: 68
End: 2023-06-23

## 2023-07-06 ENCOUNTER — APPOINTMENT (OUTPATIENT)
Dept: BREAST CENTER | Facility: CLINIC | Age: 68
End: 2023-07-06

## 2023-07-09 ENCOUNTER — NON-APPOINTMENT (OUTPATIENT)
Age: 68
End: 2023-07-09

## 2023-07-09 NOTE — ASSESSMENT
[FreeTextEntry1] : The patient is a 68-year-old G2, P2 postmenopausal white female.  She had her first child at age 32.  She never took any hormone replacement therapy after menopause.  She has no family history of breast or ovarian cancer.  The patient was initially diagnosed with a left breast cancer in 2005 and underwent a left breast lower outer quadrant partial mastectomy on March 22, 2007 for 3.5 mm moderately differentiated invasive duct cancer which was ER/SD positive HER-2/benny negative.  [place] node performed at that time was negative.  This was a pathologic prognostic stage IA breast cancer.  I attempted a MammoSite balloon catheter but had to abort the procedure and she had hypofractionated radiation down at [facility] through Dr. Berkowitz.  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  She did not receive any further radiation.  She did undergo comprehensive BRCA testing in November 2011 which was negative.  She did not receive any further radiation and was put on tamoxifen by Dr. Hugh Patel.  She then underwent a left breast stereotactic core biopsy for calcifications on June 27, 2018 showing fat necrosis and stromal fibrosis.  She developed some left breast erythema as well as fever and chills and was seen by Dr. Johnson the December 2019 and placed on antibiotics and also had a punch biopsy of the skin which showed chronic dermal inflammation.  The redness resolved spontaneously.  On exam today, the patient has no evidence of recurrence in the left breast and no suspicious findings in the right.  She underwent her last bilateral mammography and ultrasound which was reviewed from June 23, 2023 and performed in [facility] radiology which showed no suspicious findings and postop changes in the left breast.  She should follow-up again in 1 year and her next bilateral mammography and ultrasound will be due at that time in June 2024 and she was given prescriptions.  She stopped tamoxifen after 10 years of treatment in 2021.

## 2023-07-09 NOTE — REASON FOR VISIT
[Follow-Up: _____] : a [unfilled] follow-up visit [FreeTextEntry1] : The patient comes in with a personal history of a left breast cancer diagnosed in 2007 for which she underwent a partial mastectomy in March 2007 for 3.5 mm moderately differentiated invasive duct cancer which was ER/AL positive HER-2/benny negative with 1 negative sentinel lymph node making this a pathologic prognostic stage IA breast cancer.  She received hypofractionated radiation down at [facility].  She developed a recurrence in 2011 and underwent a repeat wide excision without radiation in December 2011 for low nuclear grade DCIS which was ER/AL positive.  She was placed on tamoxifen.  Stereotactic left breast core biopsy for calcifications in June 2018 just showed fat necrosis and stromal fibrosis.  She comes in for routine yearly follow-up and gets yearly mammography and ultrasound in [place].

## 2023-07-09 NOTE — HISTORY OF PRESENT ILLNESS
[FreeTextEntry1] : The patient is a 68-year-old G2, P2 postmenopausal white female.  She had her first child at age 32.  She never took any hormone replacement therapy after menopause.  She has no family history of breast or ovarian cancer.  The patient was initially diagnosed with a left breast cancer in 2005 and underwent a left breast lower outer quadrant partial mastectomy on March 22, 2007 for 3.5 mm moderately differentiated invasive duct cancer which was ER/ID positive HER-2/benny negative.  [place] node performed at that time was negative.  This was a pathologic prognostic stage IA breast cancer.  I attempted a MammoSite balloon catheter but had to abort the procedure and she had hypofractionated radiation down at [facility] through Dr. Berkowitz.  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  She did not receive any further radiation.  She did undergo comprehensive BRCA testing in November 2011 which was negative.  She did not receive any further radiation and was put on tamoxifen by Dr. Hugh Patel.  She then underwent a left breast stereotactic core biopsy for calcifications on June 27, 2018 showing fat necrosis and stromal fibrosis.  She developed some left breast erythema as well as fever and chills and was seen by Dr. Johnson the December 2019 and placed on antibiotics and also had a punch biopsy of the skin which showed chronic dermal inflammation.  The redness resolved spontaneously.  She stopped tamoxifen after 10 years of treatment in 2021.  She comes in for routine follow-up and continues to get yearly mammography and ultrasound in [place].

## 2023-07-09 NOTE — PHYSICAL EXAM
[Normocephalic] : normocephalic [Atraumatic] : atraumatic [EOMI] : extra ocular movement intact [Supple] : supple [No Supraclavicular Adenopathy] : no supraclavicular adenopathy [No Cervical Adenopathy] : no cervical adenopathy [Examined in the supine and seated position] : examined in the supine and seated position [No dominant masses] : no dominant masses in right breast  [No dominant masses] : no dominant masses left breast [No Nipple Retraction] : no left nipple retraction [No Nipple Discharge] : no left nipple discharge [Breast Mass Right Breast ___cm] : no masses [Breast Mass Left Breast ___cm] : no masses [Breast Nipple Inversion] : nipples not inverted [Breast Nipple Retraction] : nipples not retracted [Breast Nipple Flattening] : nipples not flattened [Breast Nipple Fissures] : nipples not fissured [Breast Abnormal Lactation (Galactorrhea)] : no galactorrhea [Breast Abnormal Secretion Bloody Fluid] : no bloody discharge [Breast Abnormal Secretion Serous Fluid] : no serous discharge [Breast Abnormal Secretion Opalescent Fluid] : no milky discharge [No Axillary Lymphadenopathy] : no left axillary lymphadenopathy [No Edema] : no edema [No Rashes] : no rashes [No Ulceration] : no ulceration [de-identified] : On exam, the patient has a large ptotic D-cup breast on the right and a much smaller B cup breast on the left secondary to the 2 wide excisions and prior radiation therapy.  On palpation, she has typical radiation and postop changes to the left breast but no areas suspicious for recurrence.  She has no axillary, supraclavicular, or cervical adenopathy. [de-identified] : Status post 2 partial mastectomies and prior radiation therapy with significantly smaller breast but no evidence of recurrence

## 2023-07-19 ENCOUNTER — APPOINTMENT (OUTPATIENT)
Dept: BREAST CENTER | Facility: CLINIC | Age: 68
End: 2023-07-19
Payer: MEDICARE

## 2023-07-19 VITALS
DIASTOLIC BLOOD PRESSURE: 83 MMHG | BODY MASS INDEX: 47.84 KG/M2 | HEART RATE: 73 BPM | WEIGHT: 260 LBS | SYSTOLIC BLOOD PRESSURE: 131 MMHG | HEIGHT: 62 IN | OXYGEN SATURATION: 97 %

## 2023-07-19 DIAGNOSIS — N60.19 DIFFUSE CYSTIC MASTOPATHY OF UNSPECIFIED BREAST: ICD-10-CM

## 2023-07-19 DIAGNOSIS — Z90.12 ACQUIRED ABSENCE OF LEFT BREAST AND NIPPLE: ICD-10-CM

## 2023-07-19 DIAGNOSIS — Z12.39 ENCOUNTER FOR OTHER SCREENING FOR MALIGNANT NEOPLASM OF BREAST: ICD-10-CM

## 2023-07-19 DIAGNOSIS — Z85.3 PERSONAL HISTORY OF MALIGNANT NEOPLASM OF BREAST: ICD-10-CM

## 2023-07-19 PROCEDURE — 99213 OFFICE O/P EST LOW 20 MIN: CPT

## 2023-07-19 NOTE — ASSESSMENT
[FreeTextEntry1] : The patient is a 68-year-old G2, P2 postmenopausal white female.  She had her first child at age 32.  She never took any hormone replacement therapy after menopause.  She has no family history of breast or ovarian cancer.  The patient was initially diagnosed with a left breast cancer in 2005 and underwent a left breast lower outer quadrant partial mastectomy on March 22, 2007 for 3.5 mm moderately differentiated invasive duct cancer which was ER/FL positive HER-2/benny negative.  [place] node performed at that time was negative.  This was a pathologic prognostic stage IA breast cancer.  I attempted a MammoSite balloon catheter but had to abort the procedure and she had hypofractionated radiation down at [facility] through Dr. Berkowitz.  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  She did not receive any further radiation.  She did undergo comprehensive BRCA testing in November 2011 which was negative.  She did not receive any further radiation and was put on tamoxifen by Dr. Hugh Patel.  She then underwent a left breast stereotactic core biopsy for calcifications on June 27, 2018 showing fat necrosis and stromal fibrosis.  She developed some left breast erythema as well as fever and chills and was seen by Dr. Johnson the December 2019 and placed on antibiotics and also had a punch biopsy of the skin which showed chronic dermal inflammation.  The redness resolved spontaneously.  On exam today, the patient has no evidence of recurrence in the left breast and no suspicious findings in the right.  She underwent her last bilateral mammography and ultrasound which was reviewed from June 23, 2023 and performed in [facility] radiology which showed no suspicious findings and postop changes in the left breast.  She should follow-up again in 1 year and her next bilateral mammography and ultrasound will be due at that time in June 2024 and she was given prescriptions.  She stopped tamoxifen after 10 years of treatment in 2021.

## 2023-07-19 NOTE — REASON FOR VISIT
[Follow-Up: _____] : a [unfilled] follow-up visit [FreeTextEntry1] : The patient comes in with a personal history of a left breast cancer diagnosed in 2007 for which she underwent a partial mastectomy in March 2007 for 3.5 mm moderately differentiated invasive duct cancer which was ER/MN positive HER-2/benny negative with 1 negative sentinel lymph node making this a pathologic prognostic stage IA breast cancer.  She received hypofractionated radiation down at [facility].  She developed a recurrence in 2011 and underwent a repeat wide excision without radiation in December 2011 for low nuclear grade DCIS which was ER/MN positive.  She was placed on tamoxifen.  Stereotactic left breast core biopsy for calcifications in June 2018 just showed fat necrosis and stromal fibrosis.  She comes in for routine yearly follow-up and gets yearly mammography and ultrasound in [place].

## 2023-07-19 NOTE — PHYSICAL EXAM
[Normocephalic] : normocephalic [Atraumatic] : atraumatic [EOMI] : extra ocular movement intact [Supple] : supple [No Supraclavicular Adenopathy] : no supraclavicular adenopathy [No Cervical Adenopathy] : no cervical adenopathy [Examined in the supine and seated position] : examined in the supine and seated position [No dominant masses] : no dominant masses in right breast  [No dominant masses] : no dominant masses left breast [No Nipple Retraction] : no left nipple retraction [No Nipple Discharge] : no left nipple discharge [Breast Mass Right Breast ___cm] : no masses [Breast Mass Left Breast ___cm] : no masses [No Axillary Lymphadenopathy] : no left axillary lymphadenopathy [No Edema] : no edema [No Rashes] : no rashes [No Ulceration] : no ulceration [Breast Nipple Inversion] : nipples not inverted [Breast Nipple Retraction] : nipples not retracted [Breast Nipple Flattening] : nipples not flattened [Breast Nipple Fissures] : nipples not fissured [Breast Abnormal Lactation (Galactorrhea)] : no galactorrhea [Breast Abnormal Secretion Bloody Fluid] : no bloody discharge [Breast Abnormal Secretion Serous Fluid] : no serous discharge [Breast Abnormal Secretion Opalescent Fluid] : no milky discharge [de-identified] : On exam, the patient has a large ptotic D-cup breast on the right and a much smaller B cup breast on the left secondary to the 2 wide excisions and prior radiation therapy.  On palpation, she has typical radiation and postop changes to the left breast but no areas suspicious for recurrence.  She has no axillary, supraclavicular, or cervical adenopathy. [de-identified] : Status post 2 partial mastectomies and prior radiation therapy with significantly smaller breast but no evidence of recurrence

## 2023-07-19 NOTE — HISTORY OF PRESENT ILLNESS
[FreeTextEntry1] : The patient is a 68-year-old G2, P2 postmenopausal white female.  She had her first child at age 32.  She never took any hormone replacement therapy after menopause.  She has no family history of breast or ovarian cancer.  The patient was initially diagnosed with a left breast cancer in 2005 and underwent a left breast lower outer quadrant partial mastectomy on March 22, 2007 for 3.5 mm moderately differentiated invasive duct cancer which was ER/MD positive HER-2/benny negative.  [place] node performed at that time was negative.  This was a pathologic prognostic stage IA breast cancer.  I attempted a MammoSite balloon catheter but had to abort the procedure and she had hypofractionated radiation down at [facility] through Dr. Berkowitz.  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  She did not receive any further radiation.  She did undergo comprehensive BRCA testing in November 2011 which was negative.  She did not receive any further radiation and was put on tamoxifen by Dr. Hugh Patel.  She then underwent a left breast stereotactic core biopsy for calcifications on June 27, 2018 showing fat necrosis and stromal fibrosis.  She developed some left breast erythema as well as fever and chills and was seen by Dr. Johnson the December 2019 and placed on antibiotics and also had a punch biopsy of the skin which showed chronic dermal inflammation.  The redness resolved spontaneously.  She stopped tamoxifen after 10 years of treatment in 2021.  She comes in for routine follow-up and continues to get yearly mammography and ultrasound in [place].

## 2023-07-31 ENCOUNTER — APPOINTMENT (OUTPATIENT)
Dept: CARDIOLOGY | Facility: CLINIC | Age: 68
End: 2023-07-31

## 2023-08-18 ENCOUNTER — RX RENEWAL (OUTPATIENT)
Age: 68
End: 2023-08-18

## 2023-09-13 ENCOUNTER — APPOINTMENT (OUTPATIENT)
Dept: ENDOCRINOLOGY | Facility: CLINIC | Age: 68
End: 2023-09-13
Payer: MEDICARE

## 2023-09-13 VITALS
SYSTOLIC BLOOD PRESSURE: 138 MMHG | HEART RATE: 84 BPM | WEIGHT: 264 LBS | HEIGHT: 61.8 IN | BODY MASS INDEX: 48.58 KG/M2 | OXYGEN SATURATION: 98 % | DIASTOLIC BLOOD PRESSURE: 64 MMHG

## 2023-09-13 DIAGNOSIS — Z13.820 ENCOUNTER FOR SCREENING FOR OSTEOPOROSIS: ICD-10-CM

## 2023-09-13 LAB — HBA1C MFR BLD HPLC: 6.2

## 2023-09-13 PROCEDURE — ZZZZZ: CPT

## 2023-09-13 PROCEDURE — 83036 HEMOGLOBIN GLYCOSYLATED A1C: CPT | Mod: QW

## 2023-09-13 PROCEDURE — 77080 DXA BONE DENSITY AXIAL: CPT | Mod: GA

## 2023-09-13 PROCEDURE — 99215 OFFICE O/P EST HI 40 MIN: CPT | Mod: 25

## 2023-09-13 RX ORDER — BLOOD-GLUCOSE METER
W/DEVICE EACH MISCELLANEOUS
Qty: 1 | Refills: 0 | Status: ACTIVE | COMMUNITY
Start: 2019-09-17 | End: 1900-01-01

## 2023-09-14 PROBLEM — Z13.820 SCREENING FOR OSTEOPOROSIS: Status: ACTIVE | Noted: 2018-04-09

## 2023-09-14 LAB
25(OH)D3 SERPL-MCNC: 43.8 NG/ML
ALBUMIN SERPL ELPH-MCNC: 4.4 G/DL
ALP BLD-CCNC: 80 U/L
ALT SERPL-CCNC: 21 U/L
ANION GAP SERPL CALC-SCNC: 12 MMOL/L
AST SERPL-CCNC: 19 U/L
BILIRUB SERPL-MCNC: 0.2 MG/DL
BUN SERPL-MCNC: 21 MG/DL
CALCIUM SERPL-MCNC: 10 MG/DL
CHLORIDE SERPL-SCNC: 106 MMOL/L
CHOLEST SERPL-MCNC: 126 MG/DL
CK SERPL-CCNC: 163 U/L
CO2 SERPL-SCNC: 23 MMOL/L
CREAT SERPL-MCNC: 0.92 MG/DL
CREAT SPEC-SCNC: 82 MG/DL
EGFR: 68 ML/MIN/1.73M2
GLUCOSE SERPL-MCNC: 98 MG/DL
HCT VFR BLD CALC: 42.6 %
HDLC SERPL-MCNC: 55 MG/DL
HGB BLD-MCNC: 12.9 G/DL
LDLC SERPL CALC-MCNC: 54 MG/DL
MCHC RBC-ENTMCNC: 27 PG
MCHC RBC-ENTMCNC: 30.3 GM/DL
MCV RBC AUTO: 89.1 FL
MICROALBUMIN 24H UR DL<=1MG/L-MCNC: <1.2 MG/DL
MICROALBUMIN/CREAT 24H UR-RTO: NORMAL MG/G
NONHDLC SERPL-MCNC: 71 MG/DL
PLATELET # BLD AUTO: 301 K/UL
POTASSIUM SERPL-SCNC: 4.6 MMOL/L
PROT SERPL-MCNC: 7.1 G/DL
RBC # BLD: 4.78 M/UL
RBC # FLD: 14.6 %
SODIUM SERPL-SCNC: 142 MMOL/L
TRIGL SERPL-MCNC: 90 MG/DL
TSH SERPL-ACNC: 1.62 UIU/ML
WBC # FLD AUTO: 10.57 K/UL

## 2023-11-29 ENCOUNTER — RX RENEWAL (OUTPATIENT)
Age: 68
End: 2023-11-29

## 2024-01-05 ENCOUNTER — APPOINTMENT (OUTPATIENT)
Dept: ENDOCRINOLOGY | Facility: CLINIC | Age: 69
End: 2024-01-05
Payer: MEDICARE

## 2024-01-05 PROCEDURE — 99214 OFFICE O/P EST MOD 30 MIN: CPT

## 2024-01-10 LAB
25(OH)D3 SERPL-MCNC: 38.6 NG/ML
ALBUMIN SERPL ELPH-MCNC: 4.1 G/DL
ALP BLD-CCNC: 104 U/L
ALT SERPL-CCNC: 34 U/L
ANION GAP SERPL CALC-SCNC: 11 MMOL/L
AST SERPL-CCNC: 24 U/L
BILIRUB SERPL-MCNC: 0.3 MG/DL
BUN SERPL-MCNC: 22 MG/DL
CALCIUM SERPL-MCNC: 9.4 MG/DL
CHLORIDE SERPL-SCNC: 106 MMOL/L
CHOLEST SERPL-MCNC: 138 MG/DL
CO2 SERPL-SCNC: 23 MMOL/L
CREAT SERPL-MCNC: 0.81 MG/DL
CREAT SPEC-SCNC: 113 MG/DL
EGFR: 79 ML/MIN/1.73M2
ESTIMATED AVERAGE GLUCOSE: 146 MG/DL
FOLATE SERPL-MCNC: >20 NG/ML
GLUCOSE SERPL-MCNC: 132 MG/DL
HBA1C MFR BLD HPLC: 6.7 %
HCT VFR BLD CALC: 42.5 %
HDLC SERPL-MCNC: 47 MG/DL
HGB BLD-MCNC: 13.3 G/DL
LDLC SERPL CALC-MCNC: 75 MG/DL
MCHC RBC-ENTMCNC: 27.3 PG
MCHC RBC-ENTMCNC: 31.3 GM/DL
MCV RBC AUTO: 87.3 FL
MICROALBUMIN 24H UR DL<=1MG/L-MCNC: 1.8 MG/DL
MICROALBUMIN/CREAT 24H UR-RTO: 16 MG/G
NONHDLC SERPL-MCNC: 90 MG/DL
PLATELET # BLD AUTO: 319 K/UL
POTASSIUM SERPL-SCNC: 4.3 MMOL/L
PROT SERPL-MCNC: 6.9 G/DL
RBC # BLD: 4.87 M/UL
RBC # FLD: 14.8 %
SODIUM SERPL-SCNC: 140 MMOL/L
TRIGL SERPL-MCNC: 78 MG/DL
TSH SERPL-ACNC: 2.13 UIU/ML
VIT B12 SERPL-MCNC: 896 PG/ML
WBC # FLD AUTO: 8.74 K/UL

## 2024-01-18 ENCOUNTER — RX RENEWAL (OUTPATIENT)
Age: 69
End: 2024-01-18

## 2024-02-17 ENCOUNTER — RX RENEWAL (OUTPATIENT)
Age: 69
End: 2024-02-17

## 2024-03-01 ENCOUNTER — RX RENEWAL (OUTPATIENT)
Age: 69
End: 2024-03-01

## 2024-03-01 RX ORDER — SEMAGLUTIDE 2.68 MG/ML
8 INJECTION, SOLUTION SUBCUTANEOUS
Qty: 3 | Refills: 11 | Status: ACTIVE | COMMUNITY
Start: 2022-07-14 | End: 1900-01-01

## 2024-06-11 ENCOUNTER — NON-APPOINTMENT (OUTPATIENT)
Age: 69
End: 2024-06-11

## 2024-06-24 ENCOUNTER — APPOINTMENT (OUTPATIENT)
Dept: ENDOCRINOLOGY | Facility: CLINIC | Age: 69
End: 2024-06-24

## 2024-06-26 NOTE — ASSESSMENT
[FreeTextEntry1] : The patient is a 69-year-old G2, P2 postmenopausal white female.  She had her first child at age 32.  She never took any hormone replacement therapy after menopause.  She has no family history of breast or ovarian cancer.  The patient was initially diagnosed with a left breast cancer in 2005 and underwent a left breast lower outer quadrant partial mastectomy on March 22, 2007 for 3.5 mm moderately differentiated invasive duct cancer which was ER/OR positive HER-2/benny negative.  [place] node performed at that time was negative.  This was a pathologic prognostic stage IA breast cancer.  I attempted a MammoSite balloon catheter but had to abort the procedure and she had hypofractionated radiation down at [facility] through Dr. Berkowitz.  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  She did not receive any further radiation.  She did undergo comprehensive BRCA testing in November 2011 which was negative.  She did not receive any further radiation and was put on tamoxifen by Dr. Hugh Patel.  She then underwent a left breast stereotactic core biopsy for calcifications on June 27, 2018 showing fat necrosis and stromal fibrosis.  She developed some left breast erythema as well as fever and chills and was seen by Dr. Johnson the December 2019 and placed on antibiotics and also had a punch biopsy of the skin which showed chronic dermal inflammation.  The redness resolved spontaneously.  On exam today, the patient has no evidence of recurrence in the left breast and no suspicious findings in the right.  She underwent her last bilateral mammography and ultrasound which was reviewed from ?????? June 22, 2023 and performed in [facility] radiology which showed no suspicious findings and postop changes in the left breast.  She should follow-up again in 1 year and her next bilateral mammography and ultrasound will be due at that time in ???????? June 2025 and she was given prescriptions.  She stopped tamoxifen after 10 years of treatment in 2021.

## 2024-06-26 NOTE — HISTORY OF PRESENT ILLNESS
[FreeTextEntry1] : The patient is a 69-year-old G2, P2 postmenopausal white female.  She had her first child at age 32.  She never took any hormone replacement therapy after menopause.  She has no family history of breast or ovarian cancer.  The patient was initially diagnosed with a left breast cancer in 2005 and underwent a left breast lower outer quadrant partial mastectomy on March 22, 2007 for 3.5 mm moderately differentiated invasive duct cancer which was ER/IA positive HER-2/benny negative.  [place] node performed at that time was negative.  This was a pathologic prognostic stage IA breast cancer.  I attempted a MammoSite balloon catheter but had to abort the procedure and she had hypofractionated radiation down at [facility] through Dr. Berkowitz.  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  She did not receive any further radiation.  She did undergo comprehensive BRCA testing in November 2011 which was negative.  She did not receive any further radiation and was put on tamoxifen by Dr. Hugh Patel.  She then underwent a left breast stereotactic core biopsy for calcifications on June 27, 2018 showing fat necrosis and stromal fibrosis.  She developed some left breast erythema as well as fever and chills and was seen by Dr. Johnson the December 2019 and placed on antibiotics and also had a punch biopsy of the skin which showed chronic dermal inflammation.  The redness resolved spontaneously.  She stopped tamoxifen after 10 years of treatment in 2021.  She comes in for routine follow-up and continues to get yearly mammography and ultrasound in [place].

## 2024-06-26 NOTE — PHYSICAL EXAM
[Normocephalic] : normocephalic [Atraumatic] : atraumatic [EOMI] : extra ocular movement intact [Supple] : supple [No Supraclavicular Adenopathy] : no supraclavicular adenopathy [No Cervical Adenopathy] : no cervical adenopathy [Examined in the supine and seated position] : examined in the supine and seated position [No dominant masses] : no dominant masses in right breast  [No dominant masses] : no dominant masses left breast [No Nipple Retraction] : no left nipple retraction [No Nipple Discharge] : no left nipple discharge [Breast Mass Right Breast ___cm] : no masses [Breast Mass Left Breast ___cm] : no masses [No Axillary Lymphadenopathy] : no left axillary lymphadenopathy [No Edema] : no edema [No Rashes] : no rashes [No Ulceration] : no ulceration [Breast Nipple Inversion] : nipples not inverted [Breast Nipple Retraction] : nipples not retracted [Breast Nipple Flattening] : nipples not flattened [Breast Nipple Fissures] : nipples not fissured [Breast Abnormal Lactation (Galactorrhea)] : no galactorrhea [Breast Abnormal Secretion Bloody Fluid] : no bloody discharge [Breast Abnormal Secretion Serous Fluid] : no serous discharge [Breast Abnormal Secretion Opalescent Fluid] : no milky discharge [de-identified] : On exam, the patient has a large ptotic D-cup breast on the right and a much smaller B cup breast on the left secondary to the 2 wide excisions and prior radiation therapy.  On palpation, she has typical radiation and postop changes to the left breast but no areas suspicious for recurrence.  She has no axillary, supraclavicular, or cervical adenopathy. [de-identified] : Status post 2 partial mastectomies and prior radiation therapy with significantly smaller breast but no evidence of recurrence

## 2024-06-26 NOTE — REASON FOR VISIT
[Follow-Up: _____] : a [unfilled] follow-up visit [FreeTextEntry1] : The patient comes in with a personal history of a left breast cancer diagnosed in 2007 for which she underwent a partial mastectomy in March 2007 for 3.5 mm moderately differentiated invasive duct cancer which was ER/NH positive HER-2/benny negative with 1 negative sentinel lymph node making this a pathologic prognostic stage IA breast cancer.  She received hypofractionated radiation down at [facility].  She developed a recurrence in 2011 and underwent a repeat wide excision without radiation in December 2011 for low nuclear grade DCIS which was ER/NH positive.  She was placed on tamoxifen.  Stereotactic left breast core biopsy for calcifications in June 2018 just showed fat necrosis and stromal fibrosis.  She comes in for routine yearly follow-up and gets yearly mammography and ultrasound in [place].

## 2024-07-01 ENCOUNTER — APPOINTMENT (OUTPATIENT)
Dept: ENDOCRINOLOGY | Facility: CLINIC | Age: 69
End: 2024-07-01
Payer: MEDICARE

## 2024-07-01 VITALS
WEIGHT: 256 LBS | OXYGEN SATURATION: 96 % | HEART RATE: 83 BPM | DIASTOLIC BLOOD PRESSURE: 89 MMHG | BODY MASS INDEX: 47.13 KG/M2 | SYSTOLIC BLOOD PRESSURE: 147 MMHG

## 2024-07-01 VITALS — SYSTOLIC BLOOD PRESSURE: 118 MMHG | DIASTOLIC BLOOD PRESSURE: 60 MMHG

## 2024-07-01 DIAGNOSIS — E78.5 HYPERLIPIDEMIA, UNSPECIFIED: ICD-10-CM

## 2024-07-01 DIAGNOSIS — E66.9 OBESITY, UNSPECIFIED: ICD-10-CM

## 2024-07-01 DIAGNOSIS — I10 ESSENTIAL (PRIMARY) HYPERTENSION: ICD-10-CM

## 2024-07-01 DIAGNOSIS — E55.9 VITAMIN D DEFICIENCY, UNSPECIFIED: ICD-10-CM

## 2024-07-01 DIAGNOSIS — E11.9 TYPE 2 DIABETES MELLITUS W/OUT COMPLICATIONS: ICD-10-CM

## 2024-07-01 PROCEDURE — G2211 COMPLEX E/M VISIT ADD ON: CPT

## 2024-07-01 PROCEDURE — 99214 OFFICE O/P EST MOD 30 MIN: CPT

## 2024-07-01 RX ORDER — METOPROLOL SUCCINATE 25 MG/1
25 TABLET, EXTENDED RELEASE ORAL
Refills: 0 | Status: ACTIVE | COMMUNITY

## 2024-07-03 ENCOUNTER — APPOINTMENT (OUTPATIENT)
Dept: BREAST CENTER | Facility: CLINIC | Age: 69
End: 2024-07-03
Payer: MEDICARE

## 2024-07-03 VITALS
SYSTOLIC BLOOD PRESSURE: 127 MMHG | WEIGHT: 253 LBS | HEART RATE: 79 BPM | DIASTOLIC BLOOD PRESSURE: 63 MMHG | OXYGEN SATURATION: 97 % | BODY MASS INDEX: 47.16 KG/M2 | HEIGHT: 61.5 IN

## 2024-07-03 DIAGNOSIS — R92.323 MAMMOGRAPHIC FIBROGLANDULAR DENSITY, BILATERAL BREASTS: ICD-10-CM

## 2024-07-03 DIAGNOSIS — N60.19 DIFFUSE CYSTIC MASTOPATHY OF UNSPECIFIED BREAST: ICD-10-CM

## 2024-07-03 DIAGNOSIS — Z12.39 ENCOUNTER FOR OTHER SCREENING FOR MALIGNANT NEOPLASM OF BREAST: ICD-10-CM

## 2024-07-03 DIAGNOSIS — R92.30 DENSE BREASTS, UNSPECIFIED: ICD-10-CM

## 2024-07-03 DIAGNOSIS — Z90.12 ACQUIRED ABSENCE OF LEFT BREAST AND NIPPLE: ICD-10-CM

## 2024-07-03 DIAGNOSIS — Z85.3 PERSONAL HISTORY OF MALIGNANT NEOPLASM OF BREAST: ICD-10-CM

## 2024-07-03 PROCEDURE — G2211 COMPLEX E/M VISIT ADD ON: CPT

## 2024-07-03 PROCEDURE — 99213 OFFICE O/P EST LOW 20 MIN: CPT

## 2025-01-15 ENCOUNTER — APPOINTMENT (OUTPATIENT)
Dept: ENDOCRINOLOGY | Facility: CLINIC | Age: 70
End: 2025-01-15
Payer: MEDICARE

## 2025-01-15 ENCOUNTER — APPOINTMENT (OUTPATIENT)
Dept: ENDOCRINOLOGY | Facility: CLINIC | Age: 70
End: 2025-01-15

## 2025-01-15 VITALS
HEART RATE: 78 BPM | OXYGEN SATURATION: 99 % | HEIGHT: 61.5 IN | DIASTOLIC BLOOD PRESSURE: 75 MMHG | BODY MASS INDEX: 49.39 KG/M2 | WEIGHT: 265 LBS | SYSTOLIC BLOOD PRESSURE: 140 MMHG

## 2025-01-15 VITALS — SYSTOLIC BLOOD PRESSURE: 128 MMHG | DIASTOLIC BLOOD PRESSURE: 64 MMHG

## 2025-01-15 DIAGNOSIS — E11.9 TYPE 2 DIABETES MELLITUS W/OUT COMPLICATIONS: ICD-10-CM

## 2025-01-15 DIAGNOSIS — E66.9 OBESITY, UNSPECIFIED: ICD-10-CM

## 2025-01-15 DIAGNOSIS — E78.5 HYPERLIPIDEMIA, UNSPECIFIED: ICD-10-CM

## 2025-01-15 DIAGNOSIS — E55.9 VITAMIN D DEFICIENCY, UNSPECIFIED: ICD-10-CM

## 2025-01-15 DIAGNOSIS — I10 ESSENTIAL (PRIMARY) HYPERTENSION: ICD-10-CM

## 2025-01-15 LAB — HBA1C MFR BLD HPLC: 6.4

## 2025-01-15 PROCEDURE — 83036 HEMOGLOBIN GLYCOSYLATED A1C: CPT | Mod: QW

## 2025-01-15 PROCEDURE — 99214 OFFICE O/P EST MOD 30 MIN: CPT

## 2025-01-15 PROCEDURE — G2211 COMPLEX E/M VISIT ADD ON: CPT

## 2025-01-15 RX ORDER — EZETIMIBE 10 MG/1
10 TABLET ORAL
Refills: 0 | Status: ACTIVE | COMMUNITY

## 2025-01-17 LAB
25(OH)D3 SERPL-MCNC: 42.1 NG/ML
ALBUMIN SERPL ELPH-MCNC: 4.4 G/DL
ALP BLD-CCNC: 98 U/L
ALT SERPL-CCNC: 29 U/L
ANION GAP SERPL CALC-SCNC: 12 MMOL/L
AST SERPL-CCNC: 25 U/L
BILIRUB SERPL-MCNC: 0.2 MG/DL
BUN SERPL-MCNC: 32 MG/DL
CALCIUM SERPL-MCNC: 10.1 MG/DL
CHLORIDE SERPL-SCNC: 106 MMOL/L
CHOLEST SERPL-MCNC: 142 MG/DL
CK SERPL-CCNC: 202 U/L
CO2 SERPL-SCNC: 23 MMOL/L
CREAT SERPL-MCNC: 0.95 MG/DL
EGFR: 65 ML/MIN/1.73M2
FOLATE SERPL-MCNC: >20 NG/ML
GLUCOSE SERPL-MCNC: 102 MG/DL
HCT VFR BLD CALC: 46.3 %
HDLC SERPL-MCNC: 59 MG/DL
HGB BLD-MCNC: 14.4 G/DL
LDLC SERPL CALC-MCNC: 67 MG/DL
MCHC RBC-ENTMCNC: 27.5 PG
MCHC RBC-ENTMCNC: 31.1 G/DL
MCV RBC AUTO: 88.5 FL
NONHDLC SERPL-MCNC: 82 MG/DL
PLATELET # BLD AUTO: 285 K/UL
POTASSIUM SERPL-SCNC: 4.9 MMOL/L
PROT SERPL-MCNC: 7.5 G/DL
RBC # BLD: 5.23 M/UL
RBC # FLD: 14.6 %
SODIUM SERPL-SCNC: 141 MMOL/L
TRIGL SERPL-MCNC: 82 MG/DL
TSH SERPL-ACNC: 2.2 UIU/ML
VIT B12 SERPL-MCNC: 847 PG/ML
WBC # FLD AUTO: 8.74 K/UL

## 2025-04-28 ENCOUNTER — RX RENEWAL (OUTPATIENT)
Age: 70
End: 2025-04-28

## 2025-06-02 ENCOUNTER — NON-APPOINTMENT (OUTPATIENT)
Age: 70
End: 2025-06-02

## 2025-06-26 ENCOUNTER — RX RENEWAL (OUTPATIENT)
Age: 70
End: 2025-06-26

## 2025-06-30 ENCOUNTER — APPOINTMENT (OUTPATIENT)
Dept: ENDOCRINOLOGY | Facility: CLINIC | Age: 70
End: 2025-06-30
Payer: MEDICARE

## 2025-06-30 VITALS
HEIGHT: 61.5 IN | BODY MASS INDEX: 50.32 KG/M2 | WEIGHT: 270 LBS | SYSTOLIC BLOOD PRESSURE: 120 MMHG | DIASTOLIC BLOOD PRESSURE: 70 MMHG

## 2025-06-30 VITALS
HEART RATE: 77 BPM | SYSTOLIC BLOOD PRESSURE: 122 MMHG | OXYGEN SATURATION: 99 % | HEIGHT: 65 IN | DIASTOLIC BLOOD PRESSURE: 76 MMHG | BODY MASS INDEX: 45.32 KG/M2 | WEIGHT: 272 LBS | RESPIRATION RATE: 16 BRPM

## 2025-06-30 LAB — HBA1C MFR BLD HPLC: 6.9

## 2025-06-30 PROCEDURE — G2211 COMPLEX E/M VISIT ADD ON: CPT

## 2025-06-30 PROCEDURE — 99214 OFFICE O/P EST MOD 30 MIN: CPT

## 2025-06-30 PROCEDURE — 83036 HEMOGLOBIN GLYCOSYLATED A1C: CPT | Mod: QW

## 2025-06-30 RX ORDER — TIRZEPATIDE 10 MG/.5ML
10 INJECTION, SOLUTION SUBCUTANEOUS
Qty: 1 | Refills: 11 | Status: ACTIVE | COMMUNITY
Start: 2025-06-30 | End: 1900-01-01

## 2025-06-30 RX ORDER — COLCHICINE 0.6 MG/1
0.6 TABLET ORAL
Refills: 0 | Status: ACTIVE | COMMUNITY

## 2025-07-01 LAB
25(OH)D3 SERPL-MCNC: 45 NG/ML
ALBUMIN SERPL ELPH-MCNC: 4.4 G/DL
ALP BLD-CCNC: 103 U/L
ALT SERPL-CCNC: 37 U/L
ANION GAP SERPL CALC-SCNC: 19 MMOL/L
AST SERPL-CCNC: 27 U/L
BASOPHILS # BLD AUTO: 0.05 K/UL
BASOPHILS NFR BLD AUTO: 0.6 %
BILIRUB SERPL-MCNC: 0.2 MG/DL
BUN SERPL-MCNC: 29 MG/DL
CALCIUM SERPL-MCNC: 10.2 MG/DL
CHLORIDE SERPL-SCNC: 108 MMOL/L
CHOLEST SERPL-MCNC: 138 MG/DL
CK SERPL-CCNC: 186 U/L
CO2 SERPL-SCNC: 17 MMOL/L
CREAT SERPL-MCNC: 0.98 MG/DL
CREAT SPEC-SCNC: 65 MG/DL
EGFRCR SERPLBLD CKD-EPI 2021: 62 ML/MIN/1.73M2
EOSINOPHIL # BLD AUTO: 0.23 K/UL
EOSINOPHIL NFR BLD AUTO: 2.6 %
FOLATE SERPL-MCNC: >20 NG/ML
GLUCOSE SERPL-MCNC: 109 MG/DL
HCT VFR BLD CALC: 45.5 %
HDLC SERPL-MCNC: 56 MG/DL
HGB BLD-MCNC: 14.1 G/DL
IMM GRANULOCYTES NFR BLD AUTO: 0.2 %
LDLC SERPL-MCNC: 58 MG/DL
LYMPHOCYTES # BLD AUTO: 2.36 K/UL
LYMPHOCYTES NFR BLD AUTO: 26.5 %
MAN DIFF?: NORMAL
MCHC RBC-ENTMCNC: 27.5 PG
MCHC RBC-ENTMCNC: 31 G/DL
MCV RBC AUTO: 88.7 FL
MICROALBUMIN 24H UR DL<=1MG/L-MCNC: <1.2 MG/DL
MICROALBUMIN/CREAT 24H UR-RTO: NORMAL MG/G
MONOCYTES # BLD AUTO: 0.87 K/UL
MONOCYTES NFR BLD AUTO: 9.8 %
NEUTROPHILS # BLD AUTO: 5.38 K/UL
NEUTROPHILS NFR BLD AUTO: 60.3 %
NONHDLC SERPL-MCNC: 82 MG/DL
PLATELET # BLD AUTO: 284 K/UL
POTASSIUM SERPL-SCNC: 4.6 MMOL/L
PROT SERPL-MCNC: 7.6 G/DL
RBC # BLD: 5.13 M/UL
RBC # FLD: 15.3 %
SODIUM SERPL-SCNC: 144 MMOL/L
TRIGL SERPL-MCNC: 138 MG/DL
TSH SERPL-ACNC: 1.7 UIU/ML
VIT B12 SERPL-MCNC: 911 PG/ML
WBC # FLD AUTO: 8.91 K/UL

## 2025-07-09 PROBLEM — Z12.31 BREAST CANCER SCREENING BY MAMMOGRAM: Status: ACTIVE | Noted: 2025-07-09

## 2025-07-11 ENCOUNTER — APPOINTMENT (OUTPATIENT)
Dept: BREAST CENTER | Facility: CLINIC | Age: 70
End: 2025-07-11
Payer: MEDICARE

## 2025-07-11 VITALS — BODY MASS INDEX: 50.32 KG/M2 | HEIGHT: 61.5 IN | WEIGHT: 270 LBS

## 2025-07-11 PROCEDURE — 99213 OFFICE O/P EST LOW 20 MIN: CPT

## 2025-07-11 PROCEDURE — G2211 COMPLEX E/M VISIT ADD ON: CPT

## 2025-07-21 ENCOUNTER — RX RENEWAL (OUTPATIENT)
Age: 70
End: 2025-07-21

## 2025-08-04 RX ORDER — TIRZEPATIDE 7.5 MG/.5ML
7.5 INJECTION, SOLUTION SUBCUTANEOUS
Qty: 1 | Refills: 2 | Status: ACTIVE | COMMUNITY
Start: 2025-08-01 | End: 1900-01-01

## 2025-09-13 ENCOUNTER — RX RENEWAL (OUTPATIENT)
Age: 70
End: 2025-09-13